# Patient Record
Sex: MALE | Race: WHITE | NOT HISPANIC OR LATINO | URBAN - METROPOLITAN AREA
[De-identification: names, ages, dates, MRNs, and addresses within clinical notes are randomized per-mention and may not be internally consistent; named-entity substitution may affect disease eponyms.]

---

## 2018-06-14 ENCOUNTER — EMERGENCY (EMERGENCY)
Facility: HOSPITAL | Age: 83
LOS: 1 days | Discharge: HOME | End: 2018-06-14
Attending: EMERGENCY MEDICINE
Payer: COMMERCIAL

## 2018-06-14 VITALS
SYSTOLIC BLOOD PRESSURE: 162 MMHG | HEART RATE: 93 BPM | RESPIRATION RATE: 18 BRPM | DIASTOLIC BLOOD PRESSURE: 93 MMHG | OXYGEN SATURATION: 94 % | TEMPERATURE: 98 F

## 2018-06-14 VITALS
OXYGEN SATURATION: 96 % | HEART RATE: 77 BPM | DIASTOLIC BLOOD PRESSURE: 81 MMHG | TEMPERATURE: 98 F | SYSTOLIC BLOOD PRESSURE: 154 MMHG | RESPIRATION RATE: 19 BRPM

## 2018-06-14 PROCEDURE — 76376 3D RENDER W/INTRP POSTPROCES: CPT | Mod: 26

## 2018-06-14 PROCEDURE — 73120 X-RAY EXAM OF HAND: CPT | Mod: 26,LT

## 2018-06-14 PROCEDURE — 70450 CT HEAD/BRAIN W/O DYE: CPT

## 2018-06-14 PROCEDURE — 99284 EMERGENCY DEPT VISIT MOD MDM: CPT | Mod: 25

## 2018-06-14 PROCEDURE — 70486 CT MAXILLOFACIAL W/O DYE: CPT

## 2018-06-14 PROCEDURE — 93010 ELECTROCARDIOGRAM REPORT: CPT

## 2018-06-14 PROCEDURE — 70450 CT HEAD/BRAIN W/O DYE: CPT | Mod: 26

## 2018-06-14 PROCEDURE — 70486 CT MAXILLOFACIAL W/O DYE: CPT | Mod: 26

## 2018-06-14 PROCEDURE — 73030 X-RAY EXAM OF SHOULDER: CPT

## 2018-06-14 PROCEDURE — 73120 X-RAY EXAM OF HAND: CPT

## 2018-06-14 PROCEDURE — 76376 3D RENDER W/INTRP POSTPROCES: CPT

## 2018-06-14 PROCEDURE — 71045 X-RAY EXAM CHEST 1 VIEW: CPT

## 2018-06-14 PROCEDURE — 93005 ELECTROCARDIOGRAM TRACING: CPT

## 2018-06-14 PROCEDURE — 71045 X-RAY EXAM CHEST 1 VIEW: CPT | Mod: 26

## 2018-06-14 PROCEDURE — 73030 X-RAY EXAM OF SHOULDER: CPT | Mod: 26,LT

## 2018-06-14 RX ORDER — ACETAMINOPHEN 500 MG
650 TABLET ORAL ONCE
Qty: 0 | Refills: 0 | Status: COMPLETED | OUTPATIENT
Start: 2018-06-14 | End: 2018-06-14

## 2018-06-14 RX ADMIN — Medication 650 MILLIGRAM(S): at 13:55

## 2018-06-14 RX ADMIN — Medication 650 MILLIGRAM(S): at 14:24

## 2018-06-14 NOTE — CONSULT NOTE ADULT - SUBJECTIVE AND OBJECTIVE BOX
· Chief Complaint: The patient is a 89y Male complaining of	  · HPI Objective Statement: 89yoM pw trip and fall while stepping up a curb, hitting front of left forehead. patient was able to get up, did not pass out and now is having a mild headache, throbbing, frontal. no other injury or pain. no chest pain, sob, weakness, numbness, tingling, nausea, vomiting, diarrhea, or other issues.	    PAST MEDICAL/SURGICAL/FAMILY/SOCIAL HISTORY:    Past Medical History:  BPH (Benign Prostatic Hyperplasia)    Hyperlipidemia    Hypertension.     Past Surgical History:  Hernia of abdominal cavity  60 years ago.     Family History:  No pertinent family history in first degree relatives.     Tobacco Usage:  · Tobacco Usage	Never smoker	    ALLERGIES AND HOME MEDICATIONS:   Allergies:        Allergies:  	No Known Allergies:     Home Medications:   * Outpatient Medication Status not yet specified    REVIEW OF SYSTEMS:    Review of Systems:  · CONSTITUTIONAL: - - -	  · Constitutional [-]: no chills, no fever	  · CARDIOVASCULAR: - - -	  · Cardiovascular [-]: no chest pain, no syncope	  · RESPIRATORY: - - -	  · Respiratory [-]: no cough, no shortness of breath	  · SKIN: - - -	  · Skin [+]: LACERATION	  · NEUROLOGICAL: - - -	  · Neurological [+]: HEADACHE	    Imaging:  L zygomatic arch with mild depression  All other fxr non-dispalced

## 2018-06-14 NOTE — CONSULT NOTE ADULT - ASSESSMENT
89M s/p mechanical fall with non/minimally displaced facial fxr.  - no need for acute surgical intervention  - no need for abx  - can f/u PRN  - d/w Dr. Riley

## 2018-06-14 NOTE — ED PROVIDER NOTE - OBJECTIVE STATEMENT
89yoM pw trip and fall while stepping up a curb, hitting front of left forehead. patient was able to get up, did not pass out and now is having a mild headache, throbbing, frontal. no other injury or pain. no chest pain, sob, weakness, numbness, tingling, nausea, vomiting, diarrhea, or other issues.

## 2018-06-14 NOTE — ED PROVIDER NOTE - PLAN OF CARE
1) Follow up with your doctor in 1 week. Call [phone] Dr. Riley.   2) Return to the ER immediately for new or worsening symptoms including fevers, changes in vision or other issues.   3) Take Tylenol 650mg every 6 hours as needed for pain.

## 2018-06-14 NOTE — ED PROVIDER NOTE - ATTENDING CONTRIBUTION TO CARE
Patient mechanical trip and fall, struck forehead with lac, also L hand.  No LOC, no visual changes.  Denying pain in chest, abdomen, pelvis, lower extremities.  "Nothing feels broken".  No significant headaches no nausea.    On exam patient well appearing, vital signs within normal limits, GCS 15, A&Ox4, RRR S1/S2, lungs clear to ascultation bilaterally, abdomen soft, non tender, non distended, no noted trauma to chest abdomen/pelvis, normal ROM of all extremities.    Screening imaging for traumatic injury, ambulate.

## 2018-06-14 NOTE — ED PROVIDER NOTE - PROGRESS NOTE DETAILS
discussed with plastic surgery attending. will follow up in office in 1 week. wound care. okay to gavin. Received sign out from Dr. Monahan. In brief, 89 year old male with mechanical fall Received sign out from Dr. Monahan. In brief, 89 year old male with mechanical fall hit left side of face sustaining small lac and multiple facial fractures. Case discussed with Dr. Gupta/Osvaldo from facial plastics, reviewed scans, do not recommend antibiotics, ok for outpatient follow up. Discussed plan of care and results with patient. Patient comfortable with discharge plan, understands return instructions. DONAVON Jaimes MD

## 2018-06-14 NOTE — ED PROVIDER NOTE - CARE PLAN
Principal Discharge DX:	Fall, initial encounter Principal Discharge DX:	Fall, initial encounter  Assessment and plan of treatment:	1) Follow up with your doctor in 1 week. Call [phone] Dr. Riley.   2) Return to the ER immediately for new or worsening symptoms including fevers, changes in vision or other issues.   3) Take Tylenol 650mg every 6 hours as needed for pain.

## 2018-06-14 NOTE — ED PROVIDER NOTE - PHYSICAL EXAMINATION
small, 1 cm laceration to left upper brow with some mild oozing of blood  superficial abrasion to left lateral temporal face.

## 2018-06-14 NOTE — ED ADULT NURSE REASSESSMENT NOTE - NS ED NURSE REASSESS COMMENT FT1
patient is resting in the erwin waiting for repeat ct scan. patient denies any complaints. vss/nad. will continue to monitor. family at bedside.

## 2018-06-21 ENCOUNTER — APPOINTMENT (OUTPATIENT)
Dept: PLASTIC SURGERY | Facility: CLINIC | Age: 83
End: 2018-06-21
Payer: MEDICARE

## 2018-06-21 VITALS
HEIGHT: 63.5 IN | WEIGHT: 142 LBS | SYSTOLIC BLOOD PRESSURE: 132 MMHG | HEART RATE: 71 BPM | BODY MASS INDEX: 24.85 KG/M2 | DIASTOLIC BLOOD PRESSURE: 70 MMHG

## 2018-06-21 DIAGNOSIS — S02.92XA UNSPECIFIED FRACTURE OF FACIAL BONES, INITIAL ENCOUNTER FOR CLOSED FRACTURE: ICD-10-CM

## 2018-06-21 PROCEDURE — 99203 OFFICE O/P NEW LOW 30 MIN: CPT

## 2018-07-02 PROBLEM — S02.92XA FACIAL FRACTURE: Status: ACTIVE | Noted: 2018-07-02

## 2018-07-02 RX ORDER — SIMVASTATIN 20 MG/1
20 TABLET, FILM COATED ORAL
Refills: 0 | Status: ACTIVE | COMMUNITY

## 2018-07-02 RX ORDER — POLYETHYLENE GLYCOL 3350 17 G/17G
17 POWDER, FOR SOLUTION ORAL
Refills: 0 | Status: ACTIVE | COMMUNITY

## 2018-07-02 RX ORDER — FERROUS ASPARTO GLYCINATE, IRON, ASCORBIC ACID, FOLIC ACID, CYANOCOBALAMIN, ZINC, SUCCINIC ACID, AND INTRINSIC FACTOR 50; 100; 60; 750; 250; 25; 15; 50; 100 MG/1; MG/1; MG/1; UG/1; UG/1; UG/1; MG/1; MG/1; MG/1
TABLET ORAL
Refills: 0 | Status: ACTIVE | COMMUNITY

## 2018-07-02 RX ORDER — DEXTRAN 70/DEXTROSE 10 %-WATER 32 %
VIAL (ML) INJECTION
Refills: 0 | Status: ACTIVE | COMMUNITY

## 2018-07-02 RX ORDER — FLUTICASONE PROPIONATE AND SALMETEROL 500; 50 UG/1; UG/1
POWDER RESPIRATORY (INHALATION)
Refills: 0 | Status: ACTIVE | COMMUNITY

## 2018-07-02 RX ORDER — FINASTERIDE 5 MG/1
5 TABLET, FILM COATED ORAL
Refills: 0 | Status: ACTIVE | COMMUNITY

## 2018-07-02 RX ORDER — PANTOPRAZOLE 40 MG/1
40 TABLET, DELAYED RELEASE ORAL
Refills: 0 | Status: ACTIVE | COMMUNITY

## 2018-07-02 RX ORDER — SODIUM CHLORIDE 0.65 %
0.65 AEROSOL, SPRAY (ML) NASAL
Refills: 0 | Status: ACTIVE | COMMUNITY

## 2018-07-02 RX ORDER — HYDROCHLOROTHIAZIDE 25 MG/1
25 TABLET ORAL
Refills: 0 | Status: ACTIVE | COMMUNITY

## 2019-05-23 ENCOUNTER — APPOINTMENT (OUTPATIENT)
Dept: DERMATOLOGY | Facility: CLINIC | Age: 84
End: 2019-05-23
Payer: MEDICARE

## 2019-05-23 ENCOUNTER — LABORATORY RESULT (OUTPATIENT)
Age: 84
End: 2019-05-23

## 2019-05-23 VITALS — WEIGHT: 147 LBS | BODY MASS INDEX: 25.63 KG/M2

## 2019-05-23 DIAGNOSIS — L23.9 ALLERGIC CONTACT DERMATITIS, UNSPECIFIED CAUSE: ICD-10-CM

## 2019-05-23 DIAGNOSIS — R21 RASH AND OTHER NONSPECIFIC SKIN ERUPTION: ICD-10-CM

## 2019-05-23 DIAGNOSIS — D48.9 NEOPLASM OF UNCERTAIN BEHAVIOR, UNSPECIFIED: ICD-10-CM

## 2019-05-23 PROCEDURE — 11102 TANGNTL BX SKIN SINGLE LES: CPT

## 2019-05-23 PROCEDURE — 99203 OFFICE O/P NEW LOW 30 MIN: CPT | Mod: 25

## 2019-05-23 RX ORDER — TRIAMCINOLONE ACETONIDE 1 MG/G
0.1 OINTMENT TOPICAL TWICE DAILY
Qty: 1 | Refills: 1 | Status: ACTIVE | COMMUNITY
Start: 2019-05-23 | End: 1900-01-01

## 2019-05-31 ENCOUNTER — MOBILE ON CALL (OUTPATIENT)
Age: 84
End: 2019-05-31

## 2021-09-26 ENCOUNTER — EMERGENCY (EMERGENCY)
Facility: HOSPITAL | Age: 86
LOS: 1 days | Discharge: HOME | End: 2021-09-26
Attending: EMERGENCY MEDICINE | Admitting: EMERGENCY MEDICINE
Payer: MEDICARE

## 2021-09-26 VITALS
RESPIRATION RATE: 16 BRPM | TEMPERATURE: 98 F | HEART RATE: 99 BPM | DIASTOLIC BLOOD PRESSURE: 71 MMHG | HEIGHT: 67 IN | OXYGEN SATURATION: 100 % | SYSTOLIC BLOOD PRESSURE: 108 MMHG

## 2021-09-26 PROCEDURE — 99284 EMERGENCY DEPT VISIT MOD MDM: CPT | Mod: 25

## 2021-09-26 PROCEDURE — 93010 ELECTROCARDIOGRAM REPORT: CPT

## 2021-09-26 NOTE — ED ADULT TRIAGE NOTE - CHIEF COMPLAINT QUOTE
A&ox4 male presenting for general malaise, as per daughter patient has been eating as much and appears pale. patient c/o lower back pain. no chest pain/ sob. ekg in progress.

## 2021-09-27 VITALS
TEMPERATURE: 98 F | HEART RATE: 93 BPM | SYSTOLIC BLOOD PRESSURE: 151 MMHG | RESPIRATION RATE: 15 BRPM | OXYGEN SATURATION: 100 % | DIASTOLIC BLOOD PRESSURE: 67 MMHG

## 2021-09-27 LAB
ALBUMIN SERPL ELPH-MCNC: 2.6 G/DL — LOW (ref 3.3–5)
ALP SERPL-CCNC: 725 U/L — HIGH (ref 40–120)
ALT FLD-CCNC: 22 U/L — SIGNIFICANT CHANGE UP (ref 4–41)
ANION GAP SERPL CALC-SCNC: 13 MMOL/L — SIGNIFICANT CHANGE UP (ref 7–14)
APPEARANCE UR: CLEAR — SIGNIFICANT CHANGE UP
AST SERPL-CCNC: 55 U/L — HIGH (ref 4–40)
BASOPHILS # BLD AUTO: 0.04 K/UL — SIGNIFICANT CHANGE UP (ref 0–0.2)
BASOPHILS NFR BLD AUTO: 0.5 % — SIGNIFICANT CHANGE UP (ref 0–2)
BILIRUB SERPL-MCNC: 0.9 MG/DL — SIGNIFICANT CHANGE UP (ref 0.2–1.2)
BILIRUB UR-MCNC: NEGATIVE — SIGNIFICANT CHANGE UP
BUN SERPL-MCNC: 19 MG/DL — SIGNIFICANT CHANGE UP (ref 7–23)
CALCIUM SERPL-MCNC: 8 MG/DL — LOW (ref 8.4–10.5)
CHLORIDE SERPL-SCNC: 99 MMOL/L — SIGNIFICANT CHANGE UP (ref 98–107)
CO2 SERPL-SCNC: 22 MMOL/L — SIGNIFICANT CHANGE UP (ref 22–31)
COLOR SPEC: YELLOW — SIGNIFICANT CHANGE UP
CREAT SERPL-MCNC: 1.26 MG/DL — SIGNIFICANT CHANGE UP (ref 0.5–1.3)
DIFF PNL FLD: ABNORMAL
EOSINOPHIL # BLD AUTO: 0.07 K/UL — SIGNIFICANT CHANGE UP (ref 0–0.5)
EOSINOPHIL NFR BLD AUTO: 0.9 % — SIGNIFICANT CHANGE UP (ref 0–6)
GLUCOSE SERPL-MCNC: 103 MG/DL — HIGH (ref 70–99)
GLUCOSE UR QL: NEGATIVE — SIGNIFICANT CHANGE UP
HCT VFR BLD CALC: 27.1 % — LOW (ref 39–50)
HGB BLD-MCNC: 9.1 G/DL — LOW (ref 13–17)
IANC: 4.65 K/UL — SIGNIFICANT CHANGE UP (ref 1.5–8.5)
IMM GRANULOCYTES NFR BLD AUTO: 0.4 % — SIGNIFICANT CHANGE UP (ref 0–1.5)
KETONES UR-MCNC: ABNORMAL
LEUKOCYTE ESTERASE UR-ACNC: ABNORMAL
LYMPHOCYTES # BLD AUTO: 1.63 K/UL — SIGNIFICANT CHANGE UP (ref 1–3.3)
LYMPHOCYTES # BLD AUTO: 21.8 % — SIGNIFICANT CHANGE UP (ref 13–44)
MAGNESIUM SERPL-MCNC: 2.1 MG/DL — SIGNIFICANT CHANGE UP (ref 1.6–2.6)
MCHC RBC-ENTMCNC: 30.6 PG — SIGNIFICANT CHANGE UP (ref 27–34)
MCHC RBC-ENTMCNC: 33.6 GM/DL — SIGNIFICANT CHANGE UP (ref 32–36)
MCV RBC AUTO: 91.2 FL — SIGNIFICANT CHANGE UP (ref 80–100)
MONOCYTES # BLD AUTO: 1.06 K/UL — HIGH (ref 0–0.9)
MONOCYTES NFR BLD AUTO: 14.2 % — HIGH (ref 2–14)
NEUTROPHILS # BLD AUTO: 4.65 K/UL — SIGNIFICANT CHANGE UP (ref 1.8–7.4)
NEUTROPHILS NFR BLD AUTO: 62.2 % — SIGNIFICANT CHANGE UP (ref 43–77)
NITRITE UR-MCNC: NEGATIVE — SIGNIFICANT CHANGE UP
NRBC # BLD: 0 /100 WBCS — SIGNIFICANT CHANGE UP
NRBC # FLD: 0 K/UL — SIGNIFICANT CHANGE UP
PH UR: 5.5 — SIGNIFICANT CHANGE UP (ref 5–8)
PHOSPHATE SERPL-MCNC: 3.6 MG/DL — SIGNIFICANT CHANGE UP (ref 2.5–4.5)
PLATELET # BLD AUTO: 344 K/UL — SIGNIFICANT CHANGE UP (ref 150–400)
POTASSIUM SERPL-MCNC: 5.5 MMOL/L — HIGH (ref 3.5–5.3)
POTASSIUM SERPL-SCNC: 5.5 MMOL/L — HIGH (ref 3.5–5.3)
PROT SERPL-MCNC: 5.8 G/DL — LOW (ref 6–8.3)
PROT UR-MCNC: ABNORMAL
RBC # BLD: 2.97 M/UL — LOW (ref 4.2–5.8)
RBC # FLD: 15.6 % — HIGH (ref 10.3–14.5)
SODIUM SERPL-SCNC: 134 MMOL/L — LOW (ref 135–145)
SP GR SPEC: 1.03 — SIGNIFICANT CHANGE UP (ref 1–1.05)
UROBILINOGEN FLD QL: SIGNIFICANT CHANGE UP
WBC # BLD: 7.48 K/UL — SIGNIFICANT CHANGE UP (ref 3.8–10.5)
WBC # FLD AUTO: 7.48 K/UL — SIGNIFICANT CHANGE UP (ref 3.8–10.5)

## 2021-09-27 PROCEDURE — 71045 X-RAY EXAM CHEST 1 VIEW: CPT | Mod: 26

## 2021-09-27 RX ORDER — CEFTRIAXONE 500 MG/1
1000 INJECTION, POWDER, FOR SOLUTION INTRAMUSCULAR; INTRAVENOUS ONCE
Refills: 0 | Status: COMPLETED | OUTPATIENT
Start: 2021-09-27 | End: 2021-09-27

## 2021-09-27 RX ORDER — ACETAMINOPHEN 500 MG
650 TABLET ORAL ONCE
Refills: 0 | Status: COMPLETED | OUTPATIENT
Start: 2021-09-27 | End: 2021-09-27

## 2021-09-27 RX ORDER — SODIUM CHLORIDE 9 MG/ML
500 INJECTION INTRAMUSCULAR; INTRAVENOUS; SUBCUTANEOUS ONCE
Refills: 0 | Status: COMPLETED | OUTPATIENT
Start: 2021-09-27 | End: 2021-09-27

## 2021-09-27 RX ORDER — CEPHALEXIN 500 MG
1 CAPSULE ORAL
Qty: 15 | Refills: 0
Start: 2021-09-27 | End: 2021-10-01

## 2021-09-27 RX ADMIN — Medication 650 MILLIGRAM(S): at 01:38

## 2021-09-27 RX ADMIN — SODIUM CHLORIDE 500 MILLILITER(S): 9 INJECTION INTRAMUSCULAR; INTRAVENOUS; SUBCUTANEOUS at 01:20

## 2021-09-27 RX ADMIN — CEFTRIAXONE 100 MILLIGRAM(S): 500 INJECTION, POWDER, FOR SOLUTION INTRAMUSCULAR; INTRAVENOUS at 02:56

## 2021-09-27 NOTE — ED PROVIDER NOTE - NS ED ROS FT
Constitutional: (+) generalized weakness, no fever and no chills  Eyes: no discharge, no pain, no visual changes  ENMT: no ear pain, no dysphagia or throat pain  Neck: no pain, no stiffness  CV: no chest pain, no palpitations, no edema  Resp: no cough, no shortness of breath  Abd: (+) decreased appetite, no abdominal pain, no nausea or vomiting, no diarrhea  : no dysuria, no hematuria  MSK: (+) back pain, no neck pain, no joint pain  Neuro: no LOC, no gait abnormality, no headache, no sensory deficits, no focal weakness  Skin: no rashes, no lacerations

## 2021-09-27 NOTE — ED PROVIDER NOTE - PROGRESS NOTE DETAILS
Curt EM/IM PGY4: Pt found to have UTI. CTX x1 ordered. Updated daughter on results and plan to dc to complete antibiotic course, PMD f/u in 48h, and return precautions which she verbalized understanding of and agrees with. Daughter feels safe taking patient home, will pick him up in 30 minutes.

## 2021-09-27 NOTE — ED PROVIDER NOTE - OBJECTIVE STATEMENT
89 y/o M PMH HTN, CAD, GERD, BPH presenting with 1 month of generalized weakness a/w intermittent increased pallor and decreased PO intake. Pt notes he has been feeling more weak, and that he just doesn't feel like eating as much. He denies any fevers/chills, chest pain, SOB, abd pain, n/v/d, urinary sxs, swelling, focal numbness or focal weakness. Pt notes intermittent lower back pain over the past month as well. Daughter states pt normally makes his own breakfast and walks downstairs in the mornings, but notes he's become too tired to make his own breakfast in the last 2 weeks and has not been walking downstairs as early as usual in the last week. [facility] but just obtained one for 2 days from now, however due to his increasing weakness and pallor she brought him to the ER for evaluation. No recent sick contacts. No recent medication changes.

## 2021-09-27 NOTE — ED PROVIDER NOTE - PHYSICAL EXAMINATION
PHYSICAL EXAM:  GENERAL: Sitting comfortable in bed, in no acute distress  HENMT: Atraumatic, moist mucous membranes  EYES: Clear bilaterally, PERRL, EOMs intact b/l  HEART: RRR, S1/S2, soft systolic murmur  RESPIRATORY: Clear to auscultation bilaterally, no wheezes/rhonchi/rales  ABDOMEN: +BS, soft, nontender, nondistended  BACK: No midline or paraspinal back tenderness  EXTREMITIES: No lower extremity edema, +2 radial pulses b/l  NEURO:  A&Ox4, no focal motor deficits or sensory deficits   SKIN:  Skin warm, dry and intact. No evidence of rash.

## 2021-09-27 NOTE — ED ADULT NURSE NOTE - OBJECTIVE STATEMENT
89 year old male complaining of generalized weakness, pt states it has been going on for 1 month. Pt. also complaining of decrease po intake. Pt denies fever, chills, chest pain, sob, dizziness, headache, denies urinary symptoms, denies diarrhea.  Lungs clear. NSR on cardiac monitor. 20g placed in right a/c labs sent pt medicated will monitor pt. 89 year old male complaining of generalized weakness, pt states it has been going on for 1 month. Pt. also complaining of decrease po intake. Pt denies fever, chills, chest pain, sob, dizziness, headache, denies urinary symptoms, denies diarrhea.  Pt also complaining of chronic back pain. Pt straight cath for urine, pt tolerated well. Lungs clear. NSR on cardiac monitor. 20g placed in right a/c labs sent pt medicated will monitor pt.

## 2021-09-27 NOTE — ED ADULT NURSE REASSESSMENT NOTE - NS ED NURSE REASSESS COMMENT FT1
Pt discharge, iv removed, pt brought out in wheelchair, pt pass walking test, dc instructions with pt, dc instructions discussed with daughter by MD Elias.

## 2021-09-27 NOTE — ED PROVIDER NOTE - CLINICAL SUMMARY MEDICAL DECISION MAKING FREE TEXT BOX
89 y/o M PMH HTN, CAD, GERD, BPH presenting with 1 month of generalized weakness a/w intermittent increased pallor and decreased PO intake. Overall well-appearing, no focal findings on exam. Afebrile, vital signs stable. EKG wnl. Will obtain basic labs, UA/culture, CXR r/o anemia v electrolyte abnormality v occult infection as cause of weakness, likely d/c home with PMD f/u already scheduled in 2 days.

## 2021-09-27 NOTE — ED PROVIDER NOTE - PATIENT PORTAL LINK FT
You can access the FollowMyHealth Patient Portal offered by [facility] by registering at the following website: [email or web address]. By joining [facility]’s FollowMyHealth portal, you will also be able to view your health information using other applications (apps) compatible with our system.

## 2021-09-27 NOTE — ED ADULT NURSE NOTE - NSIMPLEMENTINTERV_GEN_ALL_ED
Implemented All Fall with Harm Risk Interventions:  [place] to call system. Call bell, personal items and telephone within reach. Instruct patient to call for assistance. Room bathroom lighting operational. Non-slip footwear when patient is off stretcher. Physically safe environment: no spills, clutter or unnecessary equipment. Stretcher in lowest position, wheels locked, appropriate side rails in place. Provide visual cue, wrist band, yellow gown, etc. Monitor gait and stability. Monitor for mental status changes and reorient to person, place, and time. Review medications for side effects contributing to fall risk. Reinforce activity limits and safety measures with patient and family. Provide visual clues: red socks.

## 2021-09-27 NOTE — ED PROVIDER NOTE - ATTENDING CONTRIBUTION TO CARE
89 yo with HTN CAD BPH GERD with one month of progressive weakness and pallor.  He also reports not being as hungry and feels weak.  No FC abd pain NVD or urinary complaints.  Daughter provided collateral that he is normally much more independent than he has been in the last month.      Gen: Well appearing in NAD  Head: NC/AT  Neck: trachea midline  Resp:  No distress  Abd: soft NT ND  Ext: no deformities  Neuro:  A&O appears non focal  Skin:  Warm and dry as visualized  Psych:  Normal affect and mood     89 yo with HTN CAD BPH GERD with generalized weakness.  Need to consider lyte abnormality, anemia, or infectious etiology.  Also could be age related decline as well.  Will do broad workup.  With patient's age and level of independence at home will see if can find a cause that can be managed outpatient.  Lives with daughter who helps him.

## 2021-09-27 NOTE — ED PROVIDER NOTE - NSFOLLOWUPINSTRUCTIONS_ED_ALL_ED_FT
You were seen in the ER for weakness. You were found to have a urinary tract infection which is likely causing your symptoms. You will be discharged home and should continue to take keflex 500mg three times daily (every 8 hours) for the next 5 days. Follow-up with your primary care doctor in 48 hours at your already scheduled appointment to discuss your ER visit and for repeat evaluation and further management. Return to the ER for any new or worsening fevers/chills, headache, vision changes, neck pain, back pain, chest pain, difficulty breathing, abdominal pain, vomiting/inability to eat, numbness, weakness or any other new or concerning symptoms.

## 2021-10-05 ENCOUNTER — INPATIENT (INPATIENT)
Facility: HOSPITAL | Age: 86
LOS: 8 days | Discharge: NURSING, REHAB OR LONG-TERM CARE | End: 2021-10-14
Attending: HOSPITALIST | Admitting: HOSPITALIST
Payer: MEDICARE

## 2021-10-05 VITALS
TEMPERATURE: 98 F | OXYGEN SATURATION: 99 % | RESPIRATION RATE: 18 BRPM | HEART RATE: 98 BPM | SYSTOLIC BLOOD PRESSURE: 116 MMHG | HEIGHT: 67 IN | DIASTOLIC BLOOD PRESSURE: 70 MMHG

## 2021-10-05 LAB
ALBUMIN SERPL ELPH-MCNC: 2.5 G/DL — LOW (ref 3.3–5)
ALP SERPL-CCNC: 1129 U/L — HIGH (ref 40–120)
ALT FLD-CCNC: 22 U/L — SIGNIFICANT CHANGE UP (ref 4–41)
ANION GAP SERPL CALC-SCNC: 8 MMOL/L — SIGNIFICANT CHANGE UP (ref 7–14)
AST SERPL-CCNC: 28 U/L — SIGNIFICANT CHANGE UP (ref 4–40)
BASOPHILS # BLD AUTO: 0.04 K/UL — SIGNIFICANT CHANGE UP (ref 0–0.2)
BASOPHILS NFR BLD AUTO: 0.5 % — SIGNIFICANT CHANGE UP (ref 0–2)
BILIRUB SERPL-MCNC: 1.3 MG/DL — HIGH (ref 0.2–1.2)
BUN SERPL-MCNC: 24 MG/DL — HIGH (ref 7–23)
CALCIUM SERPL-MCNC: 8 MG/DL — LOW (ref 8.4–10.5)
CHLORIDE SERPL-SCNC: 109 MMOL/L — HIGH (ref 98–107)
CO2 SERPL-SCNC: 25 MMOL/L — SIGNIFICANT CHANGE UP (ref 22–31)
CREAT SERPL-MCNC: 1.17 MG/DL — SIGNIFICANT CHANGE UP (ref 0.5–1.3)
EOSINOPHIL # BLD AUTO: 0.04 K/UL — SIGNIFICANT CHANGE UP (ref 0–0.5)
EOSINOPHIL NFR BLD AUTO: 0.5 % — SIGNIFICANT CHANGE UP (ref 0–6)
GLUCOSE SERPL-MCNC: 107 MG/DL — HIGH (ref 70–99)
HCT VFR BLD CALC: 28.7 % — LOW (ref 39–50)
HGB BLD-MCNC: 9.3 G/DL — LOW (ref 13–17)
IANC: 5.5 K/UL — SIGNIFICANT CHANGE UP (ref 1.5–8.5)
IMM GRANULOCYTES NFR BLD AUTO: 0.7 % — SIGNIFICANT CHANGE UP (ref 0–1.5)
LYMPHOCYTES # BLD AUTO: 1.02 K/UL — SIGNIFICANT CHANGE UP (ref 1–3.3)
LYMPHOCYTES # BLD AUTO: 13.6 % — SIGNIFICANT CHANGE UP (ref 13–44)
MCHC RBC-ENTMCNC: 30.5 PG — SIGNIFICANT CHANGE UP (ref 27–34)
MCHC RBC-ENTMCNC: 32.4 GM/DL — SIGNIFICANT CHANGE UP (ref 32–36)
MCV RBC AUTO: 94.1 FL — SIGNIFICANT CHANGE UP (ref 80–100)
MONOCYTES # BLD AUTO: 0.86 K/UL — SIGNIFICANT CHANGE UP (ref 0–0.9)
MONOCYTES NFR BLD AUTO: 11.5 % — SIGNIFICANT CHANGE UP (ref 2–14)
NEUTROPHILS # BLD AUTO: 5.5 K/UL — SIGNIFICANT CHANGE UP (ref 1.8–7.4)
NEUTROPHILS NFR BLD AUTO: 73.2 % — SIGNIFICANT CHANGE UP (ref 43–77)
NRBC # BLD: 0 /100 WBCS — SIGNIFICANT CHANGE UP
NRBC # FLD: 0 K/UL — SIGNIFICANT CHANGE UP
PLATELET # BLD AUTO: 323 K/UL — SIGNIFICANT CHANGE UP (ref 150–400)
POTASSIUM SERPL-MCNC: 4.5 MMOL/L — SIGNIFICANT CHANGE UP (ref 3.5–5.3)
POTASSIUM SERPL-SCNC: 4.5 MMOL/L — SIGNIFICANT CHANGE UP (ref 3.5–5.3)
PROT SERPL-MCNC: 5.4 G/DL — LOW (ref 6–8.3)
RBC # BLD: 3.05 M/UL — LOW (ref 4.2–5.8)
RBC # FLD: 17.7 % — HIGH (ref 10.3–14.5)
SODIUM SERPL-SCNC: 142 MMOL/L — SIGNIFICANT CHANGE UP (ref 135–145)
WBC # BLD: 7.51 K/UL — SIGNIFICANT CHANGE UP (ref 3.8–10.5)
WBC # FLD AUTO: 7.51 K/UL — SIGNIFICANT CHANGE UP (ref 3.8–10.5)

## 2021-10-05 PROCEDURE — 72128 CT CHEST SPINE W/O DYE: CPT | Mod: 26

## 2021-10-05 PROCEDURE — 99285 EMERGENCY DEPT VISIT HI MDM: CPT

## 2021-10-05 PROCEDURE — 72131 CT LUMBAR SPINE W/O DYE: CPT | Mod: 26

## 2021-10-05 NOTE — ED PROVIDER NOTE - ATTENDING CONTRIBUTION TO CARE
I performed a face-to-face evaluation of the patient and performed a history and physical examination. I agree with the history and physical examination.    Saurabh: Seen here recently for increasing back pain. Treated for UTI, though Cx neg. Returns b/c back pain so bad it interferes w/ ability to do ADLs. Of note, Alk Phos was high last visit. 5/5 strength (B) LE. Concern for malignancy to spine, not yet causing cauda equina. Check labs, xray. Pain meds. Admit.

## 2021-10-05 NOTE — ED PROVIDER NOTE - NS ED ROS FT
Constitutional: No fever, chills.  Eyes:  No visual changes  ENMT:  No neck pain  Cardiac:  No chest pain  Respiratory:  No cough, SOB  GI:  No nausea, vomiting, diarrhea, abdominal pain.  :  No dysuria, hematuria  MS:  +back pain.  Neuro:  No headache or lightheadedness  Skin:  No skin rash  Except as documented in the HPI,  all other systems are negative.

## 2021-10-05 NOTE — ED PROVIDER NOTE - CLINICAL SUMMARY MEDICAL DECISION MAKING FREE TEXT BOX
Saurabh: Seen here recently for increasing back pain. Treated for UTI, though Cx neg. Returns b/c back pain so bad it interferes w/ ability to do ADLs. Of note, Alk Phos was high last visit. 5/5 strength (B) LE. Concern for malignancy to spine, not yet causing cauda equina. Check labs, xray. Pain meds. Admit.

## 2021-10-05 NOTE — ED PROVIDER NOTE - PHYSICAL EXAMINATION
Vital signs reviewed  GENERAL: Patient nontoxic appearing, NAD. cachetic   HEAD: NCAT  EYES: Anicteric, perrla eomi  ENT: MMM  NECK: Supple, non tender  RESPIRATORY: Normal respiratory effort. CTA B/L. No wheezing, rales, rhonchi  CARDIOVASCULAR: Regular rate and rhythm  ABDOMEN: Soft. Nondistended. Nontender. No guarding or rebound. No CVA tenderness.  MUSCULOSKELETAL/EXTREMITIES: Brisk cap refill. 2+ radial pulses. No leg edema. +L spine ttp, no step off.   RECTAL: normal rectal tone   SKIN:  Warm and dry  NEURO: AAOx3. No gross FND. sensation intact b/l. MS 5/5 UE and LE.   PSYCHIATRIC: Cooperative. Affect appropriate.

## 2021-10-05 NOTE — ED PROVIDER NOTE - OBJECTIVE STATEMENT
89 y/o M PMH HTN, CAD, GERD, BPH p/w hx of chronic back pain. pt states he has been having low back pain for months, worsening over past few months. unable to sit for meals. unable to go to bathroom without being in 9/10, sharp pain. unable to dress himself due to pain. pain mostly in lower back. states he takes Tylenol without any relief. +weight loss. lives with daughter who is primary care taker     pt denies any fever, chills, dizziness, cp, palpitations, sob, abdominal pain, n/v/d, dysuria, numbness/weakness, saddle anesthesia, falls/trauma  was seen recently in ED for UTI, negative Ux, finished abx course.

## 2021-10-05 NOTE — ED ADULT NURSE NOTE - OBJECTIVE STATEMENT
89 y/o male, a&ox4, received to rm 22 with c/o back pain that has been present for "the past few months." Pt has tried Tylenol for minimal relief. Pt denies any SOB, CP, and abdominal pain. No unlabored breathing. 89 y/o male, a&ox4, received to rm 22 with c/o back pain that has been present for "the past few months." Pt claims pain is intermittent and "is worst in the morning and goes away throughout the day." Pt has tried Tylenol for minimal relief. Pt denies any SOB, CP, and abdominal pain. No unlabored breathing or SOB noted. Abdomin is non-distended, flat, and skin is intact. Bruising and small break in skin noted on left elbow, dressing placed to prevent skin breakdown. NVS recorded. 20G IV placed in left wrist. Labs collected and sent off. Spoke to pt's daughter via cell phone and she shared updates about pt's PMH. Pt's daughter's contact info given to MD to further discuss plan of care.

## 2021-10-05 NOTE — ED ADULT TRIAGE NOTE - CHIEF COMPLAINT QUOTE
Pt brought in by EMS from home complaining of back pain worse when standing. Pt was recently discharged from the ED. Pt denies chest pain, sob, n/v/d, fever or chills.

## 2021-10-05 NOTE — ED PROVIDER NOTE - PROGRESS NOTE DETAILS
Aspen Novak PGY-2 spoke with daughter over the phone, concerned that patient cannot take care of himself due to pain. agreed with plan for admission given unable to perform ADLs and weight loss.  daughter phone number [phone] Dr. Garcia: Pt signed out to me by Dr. Wiley. Pt to be admitted for further pain control and metastatic work up.

## 2021-10-06 DIAGNOSIS — M54.50 LOW BACK PAIN, UNSPECIFIED: ICD-10-CM

## 2021-10-06 DIAGNOSIS — R62.7 ADULT FAILURE TO THRIVE: ICD-10-CM

## 2021-10-06 DIAGNOSIS — Z98.890 OTHER SPECIFIED POSTPROCEDURAL STATES: Chronic | ICD-10-CM

## 2021-10-06 DIAGNOSIS — D64.9 ANEMIA, UNSPECIFIED: ICD-10-CM

## 2021-10-06 DIAGNOSIS — M43.16 SPONDYLOLISTHESIS, LUMBAR REGION: ICD-10-CM

## 2021-10-06 DIAGNOSIS — Z29.9 ENCOUNTER FOR PROPHYLACTIC MEASURES, UNSPECIFIED: ICD-10-CM

## 2021-10-06 DIAGNOSIS — R74.8 ABNORMAL LEVELS OF OTHER SERUM ENZYMES: ICD-10-CM

## 2021-10-06 LAB
APTT BLD: 29.4 SEC — SIGNIFICANT CHANGE UP (ref 27–36.3)
FERRITIN SERPL-MCNC: 239 NG/ML — SIGNIFICANT CHANGE UP (ref 30–400)
INR BLD: 1.33 RATIO — HIGH (ref 0.88–1.16)
IRON SATN MFR SERPL: 17 % — SIGNIFICANT CHANGE UP (ref 14–50)
IRON SATN MFR SERPL: 21 UG/DL — LOW (ref 45–165)
LIDOCAIN IGE QN: 3 U/L — LOW (ref 7–60)
PROTHROM AB SERPL-ACNC: 15.1 SEC — HIGH (ref 10.6–13.6)
SARS-COV-2 RNA SPEC QL NAA+PROBE: SIGNIFICANT CHANGE UP
TIBC SERPL-MCNC: 127 UG/DL — LOW (ref 220–430)
UIBC SERPL-MCNC: 106 UG/DL — LOW (ref 110–370)

## 2021-10-06 PROCEDURE — 99497 ADVNCD CARE PLAN 30 MIN: CPT | Mod: 25

## 2021-10-06 PROCEDURE — 99223 1ST HOSP IP/OBS HIGH 75: CPT

## 2021-10-06 PROCEDURE — 76700 US EXAM ABDOM COMPLETE: CPT | Mod: 26

## 2021-10-06 RX ORDER — PSYLLIUM SEED (WITH DEXTROSE)
1 POWDER (GRAM) ORAL DAILY
Refills: 0 | Status: DISCONTINUED | OUTPATIENT
Start: 2021-10-06 | End: 2021-10-11

## 2021-10-06 RX ORDER — GABAPENTIN 400 MG/1
100 CAPSULE ORAL AT BEDTIME
Refills: 0 | Status: DISCONTINUED | OUTPATIENT
Start: 2021-10-06 | End: 2021-10-08

## 2021-10-06 RX ORDER — INFLUENZA VIRUS VACCINE 15; 15; 15; 15 UG/.5ML; UG/.5ML; UG/.5ML; UG/.5ML
0.5 SUSPENSION INTRAMUSCULAR ONCE
Refills: 0 | Status: DISCONTINUED | OUTPATIENT
Start: 2021-10-06 | End: 2021-10-14

## 2021-10-06 RX ORDER — FERROUS SULFATE 325(65) MG
325 TABLET ORAL
Refills: 0 | Status: DISCONTINUED | OUTPATIENT
Start: 2021-10-06 | End: 2021-10-09

## 2021-10-06 RX ORDER — ASPIRIN/CALCIUM CARB/MAGNESIUM 324 MG
81 TABLET ORAL DAILY
Refills: 0 | Status: DISCONTINUED | OUTPATIENT
Start: 2021-10-06 | End: 2021-10-07

## 2021-10-06 RX ORDER — SENNA PLUS 8.6 MG/1
1 TABLET ORAL
Refills: 0 | Status: DISCONTINUED | OUTPATIENT
Start: 2021-10-06 | End: 2021-10-11

## 2021-10-06 RX ORDER — FAMOTIDINE 10 MG/ML
20 INJECTION INTRAVENOUS DAILY
Refills: 0 | Status: DISCONTINUED | OUTPATIENT
Start: 2021-10-06 | End: 2021-10-14

## 2021-10-06 RX ORDER — ENOXAPARIN SODIUM 100 MG/ML
40 INJECTION SUBCUTANEOUS DAILY
Refills: 0 | Status: DISCONTINUED | OUTPATIENT
Start: 2021-10-06 | End: 2021-10-07

## 2021-10-06 RX ORDER — TAMSULOSIN HYDROCHLORIDE 0.4 MG/1
0.4 CAPSULE ORAL DAILY
Refills: 0 | Status: DISCONTINUED | OUTPATIENT
Start: 2021-10-06 | End: 2021-10-14

## 2021-10-06 RX ORDER — AMLODIPINE BESYLATE 2.5 MG/1
2.5 TABLET ORAL DAILY
Refills: 0 | Status: DISCONTINUED | OUTPATIENT
Start: 2021-10-06 | End: 2021-10-11

## 2021-10-06 RX ORDER — ACETAMINOPHEN 500 MG
650 TABLET ORAL EVERY 6 HOURS
Refills: 0 | Status: DISCONTINUED | OUTPATIENT
Start: 2021-10-06 | End: 2021-10-07

## 2021-10-06 RX ORDER — LANOLIN ALCOHOL/MO/W.PET/CERES
3 CREAM (GRAM) TOPICAL AT BEDTIME
Refills: 0 | Status: DISCONTINUED | OUTPATIENT
Start: 2021-10-06 | End: 2021-10-14

## 2021-10-06 RX ADMIN — GABAPENTIN 100 MILLIGRAM(S): 400 CAPSULE ORAL at 21:24

## 2021-10-06 RX ADMIN — Medication 650 MILLIGRAM(S): at 13:09

## 2021-10-06 RX ADMIN — Medication 3 MILLIGRAM(S): at 21:24

## 2021-10-06 RX ADMIN — FAMOTIDINE 20 MILLIGRAM(S): 10 INJECTION INTRAVENOUS at 13:02

## 2021-10-06 RX ADMIN — ENOXAPARIN SODIUM 40 MILLIGRAM(S): 100 INJECTION SUBCUTANEOUS at 13:03

## 2021-10-06 RX ADMIN — SENNA PLUS 1 TABLET(S): 8.6 TABLET ORAL at 18:16

## 2021-10-06 RX ADMIN — TAMSULOSIN HYDROCHLORIDE 0.4 MILLIGRAM(S): 0.4 CAPSULE ORAL at 13:02

## 2021-10-06 RX ADMIN — Medication 1 PACKET(S): at 13:03

## 2021-10-06 RX ADMIN — Medication 650 MILLIGRAM(S): at 19:48

## 2021-10-06 RX ADMIN — Medication 81 MILLIGRAM(S): at 13:09

## 2021-10-06 RX ADMIN — AMLODIPINE BESYLATE 2.5 MILLIGRAM(S): 2.5 TABLET ORAL at 13:03

## 2021-10-06 RX ADMIN — Medication 650 MILLIGRAM(S): at 20:30

## 2021-10-06 RX ADMIN — Medication 650 MILLIGRAM(S): at 10:38

## 2021-10-06 NOTE — H&P ADULT - HISTORY OF PRESENT ILLNESS
89yoM  w PMH of LBP s/p L hip arthroplasty (ab 13 years ago), unspecified gallbadder disease s/p [facility], CAD, HTN, BPH who is p/w generalized malaise and worsening lower back pain. History obtained from patient and then from patient's daughter, Anna. He states that he has had worsening LBP that is causing difficulty ambulating resulting in him being in bed for most of the day for the past week. Notably he was recently treated for a UTI w a 5 day cousrse of keflex from the ED, end date of 10/1. Pt reports no significant improvement in his pain after the course of antibiotics and is currently denying any dysuria, abd pain, fevers, chills, polyuria. Pt's daughter lives with him and helps him walk to and from the bathroom, but otherwise he has been bedridden for over 1 week. Endorses decreased appetite.      Pt's daughter also able to provide further medical history regarding his gallbladder disease-- states that 3 years ago he was told he had gallbladder cancer based [facility], however on further evaluation of pathology, he was told it most likely was NOT cancer. She also states he had "fluid surrounding his lungs" at that time that they elected to not further evaluate. 89yoM  w PMH of LBP s/p L hip arthroplasty (ab 13 years ago), unspecified gallbadder disease s/p biliary stent [facility], CAD, HTN, BPH who is p/w generalized malaise and worsening lower back pain. History obtained from patient and then from patient's daughter, Anna. He states that he has had worsening LBP that is causing difficulty ambulating resulting in him being in bed for most of the day for the past week. Notably he was recently treated for a UTI w a 5 day cousrse of keflex from the ED, end date of 10/1. Pt reports no significant improvement in his pain after the course of antibiotics and is currently denying any dysuria, abd pain, fevers, chills, polyuria. Pt's daughter lives with him and helps him walk to and from the bathroom, but otherwise he has been bedridden for over 1 week. Endorses decreased appetite. For pain mgmt, daughter has been giving him two 650 mg tablets once daily prn for pain.     Pt's daughter also able to provide further medical history regarding his gallbladder disease-- states that 3 years ago he was told he had gallbladder cancer based [facility], however on further evaluation of pathology, he was told it most likely was NOT cancer. She also states he had "fluid surrounding his lungs" at that time that they elected to not further evaluate.

## 2021-10-06 NOTE — H&P ADULT - NSICDXFAMILYHX_GEN_ALL_CORE_FT
FAMILY HISTORY:  Child  Still living? Unknown  FH: HTN (hypertension), Age at diagnosis: Age Unknown

## 2021-10-06 NOTE — ED ADULT NURSE REASSESSMENT NOTE - NS ED NURSE REASSESS COMMENT FT1
Pt to ultrasound for abdominal eval; no distress, pt continues to answer questions appropriately; OX4; sometimes however, speaks of things like, "The  was here last night and didn't give me pizza!"  Unsure if pt is confused about memories or jesting.

## 2021-10-06 NOTE — H&P ADULT - NSHPLABSRESULTS_GEN_ALL_CORE
LABS:                        9.3    7.51  )-----------( 323      ( 05 Oct 2021 21:02 )             28.7     10-05    142  |  109<H>  |  24<H>  ----------------------------<  107<H>  4.5   |  25  |  1.17    Ca    8.0<L>      05 Oct 2021 21:02    TPro  5.4<L>  /  Alb  2.5<L>  /  TBili  1.3<H>  /  DBili  x   /  AST  28  /  ALT  22  /  AlkPhos  1129<H>  10-05    RADIOLOGY & ADDITIONAL TESTS:    CT thoracic spine w/o showed:   "Partial visualization of a biliary stent. Bilateral pleural effusions are partially imaged, left greater than right. Left-sided pleural calcifications likely reflects prior asbestos exposure. Emphysematous changes in the lungs are suggested. Atheromatous calcification along the aorta.    IMPRESSION:    Diffuse osteopenia without displaced fracture.  Grade 1 L2-L3, L3-L4, and L4-L5 retrolisthesis.  Advanced multilevel spondylosis."

## 2021-10-06 NOTE — H&P ADULT - PROBLEM SELECTOR PLAN 1
Cholestatic pattern liver injury on admission w alk phos 1100 and t bili 1.3. AST/ALT wnl. History notable for unclear gallbladder disease, s/[facility] and [facility] biopsy-- pt was told he did not have a malignancy at that time. Family requesting GI/Hepatology consult for further management. Pt currently denying any abdominal pain and abd PE was unremarkable.  - PT/INR today  - Trend daily CMP  - Hepatology consult  - Recommend obtaining [facility] from [facility] if possible

## 2021-10-06 NOTE — H&P ADULT - NSHPREVIEWOFSYSTEMS_GEN_ALL_CORE
Review of Systems:   CONSTITUTIONAL: No fever, no fatigue  EYES: No eye pain or discharge  ENMT:  No sinus or throat pain  NECK: No pain or stiffness  RESPIRATORY: No cough, wheezing, chills or hemoptysis; No shortness of breath  CARDIOVASCULAR: No chest pain, dizziness, or leg swelling; daughter states he did report palpitations 2-3 days ago that have resolved  GASTROINTESTINAL: No abdominal or epigastric pain. No nausea, vomiting, or hematemesis; No diarrhea or constipation. No melena or hematochezia. Daughter states he typically has a BM 2-3 times per day and has been regular.   GENITOURINARY: No dysuria or incontinence  MUSCULOSKELETAL: As above. Lower back Pain with sitting for 5 minute increments of time. Lower back pain with ambulation.  NEUROLOGICAL: No headaches, memory loss, loss of strength, numbness, or tremors  PSYCHIATRIC: No depression, anxiety, mood swings, or difficulty sleeping  SKIN: No rashes, no skin changes

## 2021-10-06 NOTE — H&P ADULT - NSICDXPASTMEDICALHX_GEN_ALL_CORE_FT
PAST MEDICAL HISTORY:  BPH (Benign Prostatic Hyperplasia)     CAD (coronary artery disease)     Gall bladder disease     Hyperlipidemia     Hypertension

## 2021-10-06 NOTE — H&P ADULT - NSHPSOCIALHISTORY_GEN_ALL_CORE
Pt is , lives at home with his daughter. Denies ETOH, tobacco, or drug use. Ambulating with assistance (daughter) to and from bathroom. Notably, pt's son  2021 unexpectedly.

## 2021-10-06 NOTE — H&P ADULT - NSICDXPASTSURGICALHX_GEN_ALL_CORE_FT
PAST SURGICAL HISTORY:  Hernia of abdominal cavity 60 years ago    History of arthroplasty of left hip     History of biliary stent insertion

## 2021-10-06 NOTE — H&P ADULT - ASSESSMENT
89yoM  w PMH of LBP s/p L hip arthroplasty (ab 13 years ago), unspecified gallbadder disease s/p [facility], CAD, HTN, BPH who is p/w failure to thrive at home and worsening lower back pain.  89yoM  w PMH of LBP s/p L hip arthroplasty (ab 13 years ago), unspecified gallbadder disease s/p biliary stent [facility] in [facility], CAD, HTN, BPH who is p/w failure to thrive at home, decr PO intake, and worsening lower back pain. Recently completed 5 day course of keflex for a UTI. Cholestatic pattern liver test abnormalities concerning for worsening gallbladder ?disease (s/p bx, results unavailable for review as this was done at [facility]) vs malignancy.

## 2021-10-06 NOTE — H&P ADULT - NSHPPHYSICALEXAM_GEN_ALL_CORE
Vital Signs Last 24 Hrs  T(C): 36.9 (06 Oct 2021 05:08), Max: 36.9 (05 Oct 2021 17:05)  T(F): 98.4 (06 Oct 2021 05:08), Max: 98.4 (05 Oct 2021 17:05)  HR: 84 (06 Oct 2021 05:08) (78 - 98)  BP: 124/76 (06 Oct 2021 05:08) (116/70 - 125/86)  BP(mean): --  RR: 17 (06 Oct 2021 05:08) (17 - 18)  SpO2: 100% (06 Oct 2021 05:08) (99% - 100%)    CONSTITUTIONAL: Well-groomed, temporal wasting, in no apparent distress  EYES: No conjunctival or scleral injection, non-icteric; PERRLA and symmetric  ENMT: No external nasal lesions; nasal mucosa not inflamed; poor dentition; no pharyngeal injection or exudates, oral mucosa with dry membranes  NECK: Trachea midline   RESPIRATORY: Breathing comfortably; no dullness to percussion; lungs w crackles noted  in RLL field posteriorly  CARDIOVASCULAR: +S1S2, RRR, no M/G/R; no carotid bruits; pedal pulses full and symmetric; trace lower extremity edema  GASTROINTESTINAL: No palpable masses or tenderness, +BS throughout, no rebound/guarding; no hepatosplenomegaly  MUSCULOSKELETAL: no digital clubbing or cyanosis; no paraspinal tenderness; no vertebral body tenderness, no TTP of the bilateral iliac crest, no hematomas visualized, no sacral ulcers, no ulceration of the feet  SKIN: No rashes or ulcers noted; no subcutaneous nodules or induration palpable  NEUROLOGIC: CN II-XII intact  PSYCHIATRIC: A+O x 3; good historian, mood and affect appropriate; appropriate insight and judgment Vital Signs Last 24 Hrs  T(C): 36.9 (06 Oct 2021 05:08), Max: 36.9 (05 Oct 2021 17:05)  T(F): 98.4 (06 Oct 2021 05:08), Max: 98.4 (05 Oct 2021 17:05)  HR: 84 (06 Oct 2021 05:08) (78 - 98)  BP: 124/76 (06 Oct 2021 05:08) (116/70 - 125/86)  BP(mean): --  RR: 17 (06 Oct 2021 05:08) (17 - 18)  SpO2: 100% (06 Oct 2021 05:08) (99% - 100%)    CONSTITUTIONAL: Well-groomed, temporal wasting, in no apparent distress  EYES: No conjunctival or scleral injection, non-icteric; PERRLA and symmetric  ENMT: No external nasal lesions; nasal mucosa not inflamed; poor dentition; no pharyngeal injection or exudates, oral mucosa with dry membranes  NECK: Trachea midline   RESPIRATORY: Breathing comfortably; no dullness to percussion; lungs w crackles noted  in RLL field posteriorly  CARDIOVASCULAR: +S1S2, RRR, no M/G/R; no carotid bruits; pedal pulses full and symmetric; trace lower extremity edema  GASTROINTESTINAL: No palpable masses or tenderness, +BS throughout, no rebound/guarding; no hepatosplenomegaly  MUSCULOSKELETAL: no digital clubbing or cyanosis; no paraspinal tenderness; no vertebral body tenderness, no TTP of the bilateral iliac crest, no hematomas visualized, no sacral ulcers, no ulceration of the feet  SKIN: No rashes or ulcers noted; no subcutaneous nodules or induration palpable  NEUROLOGIC: CN II-XII intact, full strength of LEs bilaterally and symmetrically  PSYCHIATRIC: A+O x 3; good historian, mood and affect appropriate; appropriate insight and judgment

## 2021-10-06 NOTE — PATIENT PROFILE ADULT - VISION (WITH CORRECTIVE LENSES IF THE PATIENT USUALLY WEARS THEM):
one pair of glasses/Partially impaired: cannot see medication labels or newsprint, but can see obstacles in path, and the surrounding layout; can count fingers at arm's length

## 2021-10-06 NOTE — H&P ADULT - PROBLEM SELECTOR PLAN 5
Daughter states he has been on iron in the past but currently is not taking it. Unclear etiology of his anemia, but Hgb stable compared to Sept 2021 lab work. Pt not reporting any melena, hematochezia, hematuria.  - Iron profile, ferritin today  - Daily CBC

## 2021-10-06 NOTE — H&P ADULT - PROBLEM SELECTOR PLAN 3
Pt in bed for the past week predominately. Anorexia noted by family. Exam findings concerning for temporal wasting and trace LE edema. Albumin 2.5.  - Nutrition consult  - Case mgmt consult  - Regular diet with Ensure BID supplementation  - GOC discussed, see separate note. Pt DNR/DNI.

## 2021-10-06 NOTE — ED ADULT NURSE REASSESSMENT NOTE - NS ED NURSE REASSESS COMMENT FT1
received report form night RN, pt in spot 21A. A+Ox3, confused to time and forgetful of situation. pt denies chest pain and SOB, RR even and unlabored. complains of back pain, meds to be given as ordered. pt was OOB to bathroom with 1 assist and wheel chair. waiting for bed, will continue to monitor.

## 2021-10-06 NOTE — H&P ADULT - PROBLEM SELECTOR PLAN 2
CT thoracic/lumbar w/o reviewed. No focal findings on MSKL exam on admission.  - PT/OT eval and treat  - 650 mg PO tylenol q 6 hrs prn for pain

## 2021-10-06 NOTE — PATIENT PROFILE ADULT - AGENT'S NAME
-Pt was nauseous, weak and miserable throughout the morning and early afternoon.  -Pt received antiemetic medications with some relief.  -Pt skipped his first two meals this day. The Pt's third meal was ordered for him and he had very small amounts of a part of the foods in the meal.  -Pt was shaved and showered around the noon hour.  -Pt napped a great deal during the shift.   Anna Emerson

## 2021-10-06 NOTE — GOALS OF CARE CONVERSATION - ADVANCED CARE PLANNING - CONVERSATION DETAILS
Spoke with patient regarding code status. He was AxO x 4 at time of conversation, able to express wishes. Ultimately, stated he would like for his daughter Anna to make this decision because his son has just recently  (a code was run) and he was concerned about the cost of those medical bills.     Spoke with Anna, she states that she has had discussions with her father about this with [facility] as well. They would like for patient be DNR/DNI.

## 2021-10-07 DIAGNOSIS — R41.0 DISORIENTATION, UNSPECIFIED: ICD-10-CM

## 2021-10-07 LAB
ALBUMIN SERPL ELPH-MCNC: 2.3 G/DL — LOW (ref 3.3–5)
ALBUMIN SERPL ELPH-MCNC: 2.5 G/DL — LOW (ref 3.3–5)
ALP SERPL-CCNC: 1113 U/L — HIGH (ref 40–120)
ALP SERPL-CCNC: 1140 U/L — HIGH (ref 40–120)
ALT FLD-CCNC: 20 U/L — SIGNIFICANT CHANGE UP (ref 4–41)
ALT FLD-CCNC: 21 U/L — SIGNIFICANT CHANGE UP (ref 4–41)
AMMONIA BLD-MCNC: 34 UMOL/L — SIGNIFICANT CHANGE UP (ref 11–55)
ANION GAP SERPL CALC-SCNC: 9 MMOL/L — SIGNIFICANT CHANGE UP (ref 7–14)
ANION GAP SERPL CALC-SCNC: 9 MMOL/L — SIGNIFICANT CHANGE UP (ref 7–14)
AST SERPL-CCNC: 30 U/L — SIGNIFICANT CHANGE UP (ref 4–40)
AST SERPL-CCNC: 31 U/L — SIGNIFICANT CHANGE UP (ref 4–40)
BILIRUB DIRECT SERPL-MCNC: 0.7 MG/DL — HIGH (ref 0–0.2)
BILIRUB INDIRECT FLD-MCNC: 0.4 MG/DL — SIGNIFICANT CHANGE UP (ref 0–1)
BILIRUB SERPL-MCNC: 1.1 MG/DL — SIGNIFICANT CHANGE UP (ref 0.2–1.2)
BLD GP AB SCN SERPL QL: NEGATIVE — SIGNIFICANT CHANGE UP
BUN SERPL-MCNC: 24 MG/DL — HIGH (ref 7–23)
BUN SERPL-MCNC: 24 MG/DL — HIGH (ref 7–23)
CALCIUM SERPL-MCNC: 7.7 MG/DL — LOW (ref 8.4–10.5)
CALCIUM SERPL-MCNC: 7.7 MG/DL — LOW (ref 8.4–10.5)
CHLORIDE SERPL-SCNC: 107 MMOL/L — SIGNIFICANT CHANGE UP (ref 98–107)
CHLORIDE SERPL-SCNC: 108 MMOL/L — HIGH (ref 98–107)
CO2 SERPL-SCNC: 23 MMOL/L — SIGNIFICANT CHANGE UP (ref 22–31)
CO2 SERPL-SCNC: 25 MMOL/L — SIGNIFICANT CHANGE UP (ref 22–31)
COVID-19 SPIKE DOMAIN AB INTERP: POSITIVE
COVID-19 SPIKE DOMAIN ANTIBODY RESULT: >250 U/ML — HIGH
CREAT SERPL-MCNC: 1.01 MG/DL — SIGNIFICANT CHANGE UP (ref 0.5–1.3)
CREAT SERPL-MCNC: 1.04 MG/DL — SIGNIFICANT CHANGE UP (ref 0.5–1.3)
GLUCOSE SERPL-MCNC: 105 MG/DL — HIGH (ref 70–99)
GLUCOSE SERPL-MCNC: 90 MG/DL — SIGNIFICANT CHANGE UP (ref 70–99)
HCT VFR BLD CALC: 25.6 % — LOW (ref 39–50)
HCT VFR BLD CALC: 28.2 % — LOW (ref 39–50)
HGB BLD-MCNC: 8.5 G/DL — LOW (ref 13–17)
HGB BLD-MCNC: 9.3 G/DL — LOW (ref 13–17)
MAGNESIUM SERPL-MCNC: 2 MG/DL — SIGNIFICANT CHANGE UP (ref 1.6–2.6)
MCHC RBC-ENTMCNC: 30.9 PG — SIGNIFICANT CHANGE UP (ref 27–34)
MCHC RBC-ENTMCNC: 31.3 PG — SIGNIFICANT CHANGE UP (ref 27–34)
MCHC RBC-ENTMCNC: 33 GM/DL — SIGNIFICANT CHANGE UP (ref 32–36)
MCHC RBC-ENTMCNC: 33.2 GM/DL — SIGNIFICANT CHANGE UP (ref 32–36)
MCV RBC AUTO: 93.7 FL — SIGNIFICANT CHANGE UP (ref 80–100)
MCV RBC AUTO: 94.1 FL — SIGNIFICANT CHANGE UP (ref 80–100)
NRBC # BLD: 0 /100 WBCS — SIGNIFICANT CHANGE UP
NRBC # BLD: 0 /100 WBCS — SIGNIFICANT CHANGE UP
NRBC # FLD: 0 K/UL — SIGNIFICANT CHANGE UP
NRBC # FLD: 0 K/UL — SIGNIFICANT CHANGE UP
PHOSPHATE SERPL-MCNC: 2.8 MG/DL — SIGNIFICANT CHANGE UP (ref 2.5–4.5)
PLATELET # BLD AUTO: 285 K/UL — SIGNIFICANT CHANGE UP (ref 150–400)
PLATELET # BLD AUTO: 307 K/UL — SIGNIFICANT CHANGE UP (ref 150–400)
POTASSIUM SERPL-MCNC: 4.2 MMOL/L — SIGNIFICANT CHANGE UP (ref 3.5–5.3)
POTASSIUM SERPL-MCNC: 4.2 MMOL/L — SIGNIFICANT CHANGE UP (ref 3.5–5.3)
POTASSIUM SERPL-SCNC: 4.2 MMOL/L — SIGNIFICANT CHANGE UP (ref 3.5–5.3)
POTASSIUM SERPL-SCNC: 4.2 MMOL/L — SIGNIFICANT CHANGE UP (ref 3.5–5.3)
PROT SERPL-MCNC: 5 G/DL — LOW (ref 6–8.3)
PROT SERPL-MCNC: 5.2 G/DL — LOW (ref 6–8.3)
RBC # BLD: 2.72 M/UL — LOW (ref 4.2–5.8)
RBC # BLD: 3.01 M/UL — LOW (ref 4.2–5.8)
RBC # FLD: 17.9 % — HIGH (ref 10.3–14.5)
RBC # FLD: 18.2 % — HIGH (ref 10.3–14.5)
RH IG SCN BLD-IMP: POSITIVE — SIGNIFICANT CHANGE UP
SARS-COV-2 IGG+IGM SERPL QL IA: >250 U/ML — HIGH
SARS-COV-2 IGG+IGM SERPL QL IA: POSITIVE
SODIUM SERPL-SCNC: 140 MMOL/L — SIGNIFICANT CHANGE UP (ref 135–145)
SODIUM SERPL-SCNC: 141 MMOL/L — SIGNIFICANT CHANGE UP (ref 135–145)
TROPONIN T, HIGH SENSITIVITY RESULT: 55 NG/L — CRITICAL HIGH
WBC # BLD: 5.63 K/UL — SIGNIFICANT CHANGE UP (ref 3.8–10.5)
WBC # BLD: 6.05 K/UL — SIGNIFICANT CHANGE UP (ref 3.8–10.5)
WBC # FLD AUTO: 5.63 K/UL — SIGNIFICANT CHANGE UP (ref 3.8–10.5)
WBC # FLD AUTO: 6.05 K/UL — SIGNIFICANT CHANGE UP (ref 3.8–10.5)

## 2021-10-07 PROCEDURE — 99222 1ST HOSP IP/OBS MODERATE 55: CPT | Mod: GC

## 2021-10-07 PROCEDURE — 93010 ELECTROCARDIOGRAM REPORT: CPT

## 2021-10-07 PROCEDURE — 99233 SBSQ HOSP IP/OBS HIGH 50: CPT

## 2021-10-07 RX ORDER — DEXTROSE 50 % IN WATER 50 %
12.5 SYRINGE (ML) INTRAVENOUS ONCE
Refills: 0 | Status: DISCONTINUED | OUTPATIENT
Start: 2021-10-07 | End: 2021-10-14

## 2021-10-07 RX ORDER — DEXTROSE 50 % IN WATER 50 %
25 SYRINGE (ML) INTRAVENOUS ONCE
Refills: 0 | Status: DISCONTINUED | OUTPATIENT
Start: 2021-10-07 | End: 2021-10-14

## 2021-10-07 RX ORDER — ACETAMINOPHEN 500 MG
650 TABLET ORAL EVERY 6 HOURS
Refills: 0 | Status: DISCONTINUED | OUTPATIENT
Start: 2021-10-07 | End: 2021-10-14

## 2021-10-07 RX ORDER — SODIUM CHLORIDE 9 MG/ML
1000 INJECTION, SOLUTION INTRAVENOUS
Refills: 0 | Status: DISCONTINUED | OUTPATIENT
Start: 2021-10-07 | End: 2021-10-11

## 2021-10-07 RX ORDER — GLUCAGON INJECTION, SOLUTION 0.5 MG/.1ML
1 INJECTION, SOLUTION SUBCUTANEOUS ONCE
Refills: 0 | Status: DISCONTINUED | OUTPATIENT
Start: 2021-10-07 | End: 2021-10-14

## 2021-10-07 RX ORDER — DEXTROSE 50 % IN WATER 50 %
15 SYRINGE (ML) INTRAVENOUS ONCE
Refills: 0 | Status: DISCONTINUED | OUTPATIENT
Start: 2021-10-07 | End: 2021-10-14

## 2021-10-07 RX ADMIN — TAMSULOSIN HYDROCHLORIDE 0.4 MILLIGRAM(S): 0.4 CAPSULE ORAL at 12:27

## 2021-10-07 RX ADMIN — Medication 650 MILLIGRAM(S): at 19:02

## 2021-10-07 RX ADMIN — Medication 650 MILLIGRAM(S): at 02:35

## 2021-10-07 RX ADMIN — Medication 650 MILLIGRAM(S): at 17:43

## 2021-10-07 RX ADMIN — Medication 325 MILLIGRAM(S): at 05:40

## 2021-10-07 RX ADMIN — GABAPENTIN 100 MILLIGRAM(S): 400 CAPSULE ORAL at 21:14

## 2021-10-07 RX ADMIN — Medication 650 MILLIGRAM(S): at 10:05

## 2021-10-07 RX ADMIN — Medication 650 MILLIGRAM(S): at 01:48

## 2021-10-07 RX ADMIN — SENNA PLUS 1 TABLET(S): 8.6 TABLET ORAL at 05:40

## 2021-10-07 RX ADMIN — Medication 1 PACKET(S): at 12:27

## 2021-10-07 RX ADMIN — Medication 650 MILLIGRAM(S): at 23:07

## 2021-10-07 RX ADMIN — FAMOTIDINE 20 MILLIGRAM(S): 10 INJECTION INTRAVENOUS at 12:27

## 2021-10-07 RX ADMIN — Medication 650 MILLIGRAM(S): at 08:50

## 2021-10-07 RX ADMIN — Medication 3 MILLIGRAM(S): at 21:14

## 2021-10-07 RX ADMIN — SENNA PLUS 1 TABLET(S): 8.6 TABLET ORAL at 18:43

## 2021-10-07 RX ADMIN — AMLODIPINE BESYLATE 2.5 MILLIGRAM(S): 2.5 TABLET ORAL at 05:41

## 2021-10-07 RX ADMIN — Medication 81 MILLIGRAM(S): at 12:27

## 2021-10-07 NOTE — CONSULT NOTE ADULT - ASSESSMENT
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 2020 in a hospital in [place]) , CAD, HTN, BPH who is p/w generalized malaise and worsening lower back pain. History obtained from patient and then from patient's daughter, Anna. He states that he has had worsening LBP that is causing difficulty ambulating resulting in him being in bed for most of the day for the past week. Notably he was recently treated for a UTI w a 5 day cousrse of keflex from the ED, end date of 10/1. Pt reports no significant improvement in his pain after the course of antibiotics and is currently denying any dysuria, abd pain, fevers, chills, polyuria. Pt's daughter lives with him and helps him walk to and from the bathroom, but otherwise he has been bedridden for over 1 week. Endorses decreased appetite.    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 Suggestion of scattered hypoechoic lesions.             Bile ducts: Stent in place. Pneumobilia.  Gallbladder: Not visualized.  - I talked to his daughter Anna ( [phone]) over the phone, She is not very sure where and when the stent was placed, states that 3 years ago he was told he had gallbladder cancer based [facility], however on further evaluation of pathology, he was told it most likely was NOT cancer. He did not receive any chemo or radiation    Rec:  - Monitor LFT/INR daily  - send GGT and fractionated ALP  - CT abdomen/pelvis IV contrast  - Patient may not tolerate MRI/MRCP without sedation --> will hold off based on age and frailty  -  Send tumor markers--> , CEA, AFP  - I discussed with her daughter about the diagnostic and possible treatment plan, she is not sure about any invasive procedure or possible chemo/radiation. she will need time to think about it.  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 2020 in a hospital in [place]) , CAD, HTN, BPH who is p/w generalized malaise and worsening lower back pain. History obtained from patient and then from patient's daughter, Anna. He states that he has had worsening LBP that is causing difficulty ambulating resulting in him being in bed for most of the day for the past week. Notably he was recently treated for a UTI w a 5 day cousrse of keflex from the ED, end date of 10/1. Pt reports no significant improvement in his pain after the course of antibiotics and is currently denying any dysuria, abd pain, fevers, chills, polyuria. Pt's daughter lives with him and helps him walk to and from the bathroom, but otherwise he has been bedridden for over 1 week. Endorses decreased appetite.    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 Suggestion of scattered hypoechoic lesions.             Bile ducts: Stent in place. Pneumobilia.  Gallbladder: Not visualized.  - I talked to his daughter Anna ( [phone]) over the phone, She is not very sure where and when the stent was placed, states that 3 years ago he was told he had gallbladder cancer based [facility], however on further evaluation of pathology, he was told it most likely was NOT cancer. He did not receive any chemo or radiation    Rec:  - Monitor LFT/INR daily  - send GGT and fractionated ALP  -  Send tumor markers--> , CEA, AFP  - I discussed with her daughter about the diagnostic and possible treatment plan, she is not sure about any invasive procedure or possible chemo/radiation. she will need time to think about it.  - [facility] from [facility]

## 2021-10-07 NOTE — PROGRESS NOTE ADULT - ASSESSMENT
89yoM  w PMH of LBP s/p L hip arthroplasty (ab 13 years ago), unspecified gallbadder disease s/p [facility], CAD, HTN, BPH who is p/w failure to thrive at home, decr PO intake, and worsening lower back pain. Recently completed 5 day course of keflex for a UTI. Cholestatic pattern liver test abnormalities concerning for worsening gallbladder ?disease (s/p bx, results unavailable for review as this was done [facility]) vs malignancy. Abd US obtained on admission with ascites and findings concerning for metastatic liver disease. 89yoM  w PMH of LBP s/p L hip arthroplasty (ab 13 years ago), unspecified gallbadder disease s/p biliary stent [facility] in [facility], CAD, HTN, BPH who is p/w failure to thrive at home, decr PO intake, and worsening lower back pain. Recently completed 5 day course of keflex for a UTI. Cholestatic pattern liver test abnormalities concerning for worsening gallbladder ?disease (s/p bx, results unavailable for review as this was [facility]) vs malignancy. Abd US obtained on admission with ascites and findings concerning for metastatic liver disease.

## 2021-10-07 NOTE — CONSULT NOTE ADULT - ATTENDING COMMENTS
Patient with history of biliary intervention and placement of stent.  Now admitted with back pain under evaluation. Elevated alk phos is likely related to known hepatobiliary pathology. Bilirubin near normal and no evidence of cholangitis. Ultrasound suggested presence of possible malignant disease in liver. At present will await availability of prior records to determine if further intervention is warranted.

## 2021-10-07 NOTE — PROGRESS NOTE ADULT - PROBLEM SELECTOR PLAN 5
Daughter states he has been on iron in the past but currently is not taking it. Unclear etiology of his anemia, but Hgb stable compared to Sept 2021 lab work. Pt not reporting any melena, hematochezia, hematuria. Iron profile, ferritin on admission consistent with iron def anemia.   - ferrous sulfate q48 hr ordered this admission  - Daily CBC Daughter states he has been on iron in the past but currently is not taking it. Unclear etiology of his anemia, but Hgb stable compared to Sept 2021 lab work. Pt not reporting any melena, hematochezia, hematuria. Iron profile, ferritin on admission consistent with iron def anemia.   - ferrous sulfate q48 hr ordered this admission  - Daily CBC  - Type and screen Daughter states he has been on iron in the past but currently is not taking it. Unclear etiology of his anemia, but Hgb stable compared to Sept 2021 lab work. Pt not reporting any melena, hematochezia, hematuria. Iron profile, ferritin on admission consistent with anemia of chronic disease, however FIT from [facility] records was pos for occult blood, therefore suspect multifactorial etiology.   - ferrous sulfate q48 hr ordered this admission  - Daily CBC  - Type and screen

## 2021-10-07 NOTE — PROGRESS NOTE ADULT - SUBJECTIVE AND OBJECTIVE BOX
Pt sleeping comfortably. Disoriented upon awakening. No abdominal pain, hematochezia, melena reported overnight. Still complaining of LBP that is mildly responsive to Tylenol. Vitals HDS overnight.    [facility] Division of Hospital Medicine  Kait Amaro MD  Pager (M-F, 8A-5P): [phone]  Other Times:  [phone]      SUBJECTIVE / OVERNIGHT EVENTS:    MEDICATIONS  (STANDING):  amLODIPine   Tablet 2.5 milliGRAM(s) Oral daily  aspirin  chewable 81 milliGRAM(s) Oral daily  famotidine    Tablet 20 milliGRAM(s) Oral daily  ferrous    sulfate 325 milliGRAM(s) Oral <User Schedule>  gabapentin 100 milliGRAM(s) Oral at bedtime  influenza   Vaccine 0.5 milliLiter(s) IntraMuscular once  melatonin 3 milliGRAM(s) Oral at bedtime  psyllium Powder 1 Packet(s) Oral daily  senna 1 Tablet(s) Oral two times a day  tamsulosin 0.4 milliGRAM(s) Oral daily    MEDICATIONS  (PRN):  acetaminophen   Tablet .. 650 milliGRAM(s) Oral every 6 hours PRN Temp greater or equal to 38C (100.4F), Mild Pain (1 - 3)      I&O's Summary    06 Oct 2021 07:01  -  07 Oct 2021 07:00  --------------------------------------------------------  IN: 290 mL / OUT: 250 mL / NET: 40 mL        PHYSICAL EXAM:  Vital Signs Last 24 Hrs  T(C): 36.6 (07 Oct 2021 09:56), Max: 37 (06 Oct 2021 14:56)  T(F): 97.8 (07 Oct 2021 09:56), Max: 98.6 (06 Oct 2021 14:56)  HR: 79 (07 Oct 2021 09:56) (79 - 98)  BP: 129/64 (07 Oct 2021 09:56) (118/73 - 135/73)  BP(mean): --  RR: 18 (07 Oct 2021 09:56) (18 - 19)  SpO2: 97% (07 Oct 2021 09:56) (97% - 100%)  CONSTITUTIONAL: NAD, cachectic, well-groomed, pallor  EYES: conjunctiva and sclera clear, non icteric  ENMT: Dry oral mucosa, no pharyngeal injection or exudates; poor dentition  RESPIRATORY: Normal respiratory effort; bibasilar crackles appreciated, worse on the RLL posterior field  CARDIOVASCULAR: Regular rate and rhythm, normal S1 and S2, no murmur/rub/gallop; No lower extremity edema; Peripheral pulses are 2+ bilaterally  ABDOMEN: Nontender to palpation, normoactive bowel sounds, no rebound/guarding; No hepatosplenomegaly, no tense ascites  PSYCH: A+O to person, place, and time; affect appropriate  SKIN: No rashes; no palpable lesions    LABS:                        8.5    6.05  )-----------( 285      ( 07 Oct 2021 07:24 )             25.6     10-07    140  |  108<H>  |  24<H>  ----------------------------<  105<H>  4.2   |  23  |  1.04    Ca    7.7<L>      07 Oct 2021 07:24  Phos  2.8     10-07  Mg     2.00     10-07    TPro  5.0<L>  /  Alb  2.3<L>  /  TBili  1.1  /  DBili  0.7<H>  /  AST  30  /  ALT  20  /  AlkPhos  1113<H>  10-07    PT/INR - ( 06 Oct 2021 11:18 )   PT: 15.1 sec;   INR: 1.33 ratio         PTT - ( 06 Oct 2021 11:18 )  PTT:29.4 sec            RADIOLOGY & ADDITIONAL TESTS:  Results Reviewed:   Abd US results-- ascites visualized, findings concerning for malignancy      COORDINATION OF CARE:  Care Discussed with Consultants/Other Providers [Y/N]: GI/Hep  Prior or Outpatient Records Reviewed [Y/N]: N, unavailable   Pt sleeping comfortably. Disoriented upon awakening. No abdominal pain, hematochezia, melena reported overnight. Still complaining of LBP that is mildly responsive to Tylenol. Vitals HDS overnight. Breakfast tray untouched at bedside.    [facility]  Kait Amaro MD  Pager (M-F, 8A-5P): [phone]  Other Times:  [phone]      SUBJECTIVE / OVERNIGHT EVENTS:    MEDICATIONS  (STANDING):  amLODIPine   Tablet 2.5 milliGRAM(s) Oral daily  aspirin  chewable 81 milliGRAM(s) Oral daily  famotidine    Tablet 20 milliGRAM(s) Oral daily  ferrous    sulfate 325 milliGRAM(s) Oral <User Schedule>  gabapentin 100 milliGRAM(s) Oral at bedtime  influenza   Vaccine 0.5 milliLiter(s) IntraMuscular once  melatonin 3 milliGRAM(s) Oral at bedtime  psyllium Powder 1 Packet(s) Oral daily  senna 1 Tablet(s) Oral two times a day  tamsulosin 0.4 milliGRAM(s) Oral daily    MEDICATIONS  (PRN):  acetaminophen   Tablet .. 650 milliGRAM(s) Oral every 6 hours PRN Temp greater or equal to 38C (100.4F), Mild Pain (1 - 3)      I&O's Summary    06 Oct 2021 07:01  -  07 Oct 2021 07:00  --------------------------------------------------------  IN: 290 mL / OUT: 250 mL / NET: 40 mL        PHYSICAL EXAM:  Vital Signs Last 24 Hrs  T(C): 36.6 (07 Oct 2021 09:56), Max: 37 (06 Oct 2021 14:56)  T(F): 97.8 (07 Oct 2021 09:56), Max: 98.6 (06 Oct 2021 14:56)  HR: 79 (07 Oct 2021 09:56) (79 - 98)  BP: 129/64 (07 Oct 2021 09:56) (118/73 - 135/73)  BP(mean): --  RR: 18 (07 Oct 2021 09:56) (18 - 19)  SpO2: 97% (07 Oct 2021 09:56) (97% - 100%)  CONSTITUTIONAL: NAD, cachectic, well-groomed, pallor  EYES: conjunctiva and sclera clear, non icteric  ENMT: Dry oral mucosa, no pharyngeal injection or exudates; poor dentition  RESPIRATORY: Normal respiratory effort; bibasilar crackles appreciated, worse on the RLL posterior field  CARDIOVASCULAR: Regular rate and rhythm, normal S1 and S2, no murmur/rub/gallop; No lower extremity edema; Peripheral pulses are 2+ bilaterally  ABDOMEN: Nontender to palpation, normoactive bowel sounds, no rebound/guarding; No hepatosplenomegaly, no tense ascites  PSYCH: A+O to person, place, and time; affect appropriate  SKIN: No rashes; no palpable lesions    LABS:                        8.5    6.05  )-----------( 285      ( 07 Oct 2021 07:24 )             25.6     10-07    140  |  108<H>  |  24<H>  ----------------------------<  105<H>  4.2   |  23  |  1.04    Ca    7.7<L>      07 Oct 2021 07:24  Phos  2.8     10-07  Mg     2.00     10-07    TPro  5.0<L>  /  Alb  2.3<L>  /  TBili  1.1  /  DBili  0.7<H>  /  AST  30  /  ALT  20  /  AlkPhos  1113<H>  10-07    PT/INR - ( 06 Oct 2021 11:18 )   PT: 15.1 sec;   INR: 1.33 ratio         PTT - ( 06 Oct 2021 11:18 )  PTT:29.4 sec            RADIOLOGY & ADDITIONAL TESTS:  Results Reviewed:   Abd US results-- ascites visualized, findings concerning for malignancy      COORDINATION OF CARE:  Care Discussed with Consultants/Other Providers [Y/N]: GI/Hep  Prior or Outpatient Records Reviewed [Y/N]: N, unavailable   Pt sleeping comfortably. Disoriented upon awakening. No abdominal pain, hematochezia, melena reported overnight. Still complaining of LBP that is mildly responsive to Tylenol. Vitals HDS overnight. Breakfast tray untouched at bedside.    10/7 2pm: Phone call to daughter Anna. Updated regarding patient's decr PO intake. Still awaiting CT scan. Discussed that we will wait until we have results from CT scan, and then get palliative care involved once a diagnosis of his abdominal pathology is more clear. Also updated her regarding his delirium this am.    [facility] Division of Hospital Medicine  Kait Amaro MD  Pager (M-F, 8A-5P): [phone]  Other Times:  [phone]      SUBJECTIVE / OVERNIGHT EVENTS:    MEDICATIONS  (STANDING):  amLODIPine   Tablet 2.5 milliGRAM(s) Oral daily  aspirin  chewable 81 milliGRAM(s) Oral daily  famotidine    Tablet 20 milliGRAM(s) Oral daily  ferrous    sulfate 325 milliGRAM(s) Oral <User Schedule>  gabapentin 100 milliGRAM(s) Oral at bedtime  influenza   Vaccine 0.5 milliLiter(s) IntraMuscular once  melatonin 3 milliGRAM(s) Oral at bedtime  psyllium Powder 1 Packet(s) Oral daily  senna 1 Tablet(s) Oral two times a day  tamsulosin 0.4 milliGRAM(s) Oral daily    MEDICATIONS  (PRN):  acetaminophen   Tablet .. 650 milliGRAM(s) Oral every 6 hours PRN Temp greater or equal to 38C (100.4F), Mild Pain (1 - 3)      I&O's Summary    06 Oct 2021 07:01  -  07 Oct 2021 07:00  --------------------------------------------------------  IN: 290 mL / OUT: 250 mL / NET: 40 mL        PHYSICAL EXAM:  Vital Signs Last 24 Hrs  T(C): 36.6 (07 Oct 2021 09:56), Max: 37 (06 Oct 2021 14:56)  T(F): 97.8 (07 Oct 2021 09:56), Max: 98.6 (06 Oct 2021 14:56)  HR: 79 (07 Oct 2021 09:56) (79 - 98)  BP: 129/64 (07 Oct 2021 09:56) (118/73 - 135/73)  BP(mean): --  RR: 18 (07 Oct 2021 09:56) (18 - 19)  SpO2: 97% (07 Oct 2021 09:56) (97% - 100%)  CONSTITUTIONAL: NAD, cachectic, well-groomed, pallor  EYES: conjunctiva and sclera clear, non icteric  ENMT: Dry oral mucosa, no pharyngeal injection or exudates; poor dentition  RESPIRATORY: Normal respiratory effort; bibasilar crackles appreciated, worse on the RLL posterior field  CARDIOVASCULAR: Regular rate and rhythm, normal S1 and S2, no murmur/rub/gallop; No lower extremity edema; Peripheral pulses are 2+ bilaterally  ABDOMEN: Nontender to palpation, normoactive bowel sounds, no rebound/guarding; No hepatosplenomegaly, no tense ascites  PSYCH: A+O to person, place, and time; affect appropriate  SKIN: No rashes; no palpable lesions    LABS:                        8.5    6.05  )-----------( 285      ( 07 Oct 2021 07:24 )             25.6     10-07    140  |  108<H>  |  24<H>  ----------------------------<  105<H>  4.2   |  23  |  1.04    Ca    7.7<L>      07 Oct 2021 07:24  Phos  2.8     10-07  Mg     2.00     10-07    TPro  5.0<L>  /  Alb  2.3<L>  /  TBili  1.1  /  DBili  0.7<H>  /  AST  30  /  ALT  20  /  AlkPhos  1113<H>  10-07    PT/INR - ( 06 Oct 2021 11:18 )   PT: 15.1 sec;   INR: 1.33 ratio         PTT - ( 06 Oct 2021 11:18 )  PTT:29.4 sec            RADIOLOGY & ADDITIONAL TESTS:  Results Reviewed:   Abd US results-- ascites visualized, findings concerning for malignancy      COORDINATION OF CARE:  Care Discussed with Consultants/Other Providers [Y/N]: GI/Hep  Prior or Outpatient Records Reviewed [Y/N]: N, unavailable

## 2021-10-07 NOTE — PROGRESS NOTE ADULT - PROBLEM SELECTOR PLAN 1
Cholestatic pattern liver injury on admission w alk phos 1100 and t bili 1.3. AST/ALT wnl. History notable for unclear gallbladder disease, s/[facility] and [facility]-- pt was told he did not have a malignancy at that time. Family requesting GI/Hepatology consult for further management/prognostication. Pt currently denying any abdominal pain.   - PT/INR daily  - Trend daily CMP  - CA-19 9, CEA, GGT ordered  - Hepatology consulted  - [facility] if [facility]  - CT abd pelv w contrast 10/7 ordered Cholestatic pattern liver injury on admission w alk phos 1100 and t bili 1.3. AST/ALT wnl. History notable for unclear gallbladder disease, s/p biliary [facility] and post biopsy-- pt was told he did not have a malignancy at that time. Family requesting GI/Hepatology consult for further management/prognostication. Pt currently denying any abdominal pain.   - PT/INR daily  - Trend daily CMP  - CA-19 9, CEA, GGT ordered  - Hepatology consulted  - Recommend [facility] if possible  - CT abd pelv w contrast 10/7 ordered  - Cont bowel regimen-- senna, metamucil

## 2021-10-08 ENCOUNTER — TRANSCRIPTION ENCOUNTER (OUTPATIENT)
Age: 86
End: 2021-10-08

## 2021-10-08 DIAGNOSIS — Z51.5 ENCOUNTER FOR PALLIATIVE CARE: ICD-10-CM

## 2021-10-08 DIAGNOSIS — M54.9 DORSALGIA, UNSPECIFIED: ICD-10-CM

## 2021-10-08 DIAGNOSIS — C79.9 SECONDARY MALIGNANT NEOPLASM OF UNSPECIFIED SITE: ICD-10-CM

## 2021-10-08 DIAGNOSIS — I25.10 ATHEROSCLEROTIC HEART DISEASE OF NATIVE CORONARY ARTERY WITHOUT ANGINA PECTORIS: ICD-10-CM

## 2021-10-08 LAB
AFP-TM SERPL-MCNC: <1.8 NG/ML — SIGNIFICANT CHANGE UP
ALBUMIN SERPL ELPH-MCNC: 2.2 G/DL — LOW (ref 3.3–5)
ALP SERPL-CCNC: 1091 U/L — HIGH (ref 40–120)
ALT FLD-CCNC: 18 U/L — SIGNIFICANT CHANGE UP (ref 4–41)
ANION GAP SERPL CALC-SCNC: 10 MMOL/L — SIGNIFICANT CHANGE UP (ref 7–14)
AST SERPL-CCNC: 34 U/L — SIGNIFICANT CHANGE UP (ref 4–40)
BILIRUB SERPL-MCNC: 1 MG/DL — SIGNIFICANT CHANGE UP (ref 0.2–1.2)
BUN SERPL-MCNC: 23 MG/DL — SIGNIFICANT CHANGE UP (ref 7–23)
CALCIUM SERPL-MCNC: 7.5 MG/DL — LOW (ref 8.4–10.5)
CANCER AG19-9 SERPL-ACNC: 7322 U/ML — HIGH
CEA SERPL-MCNC: 9.7 NG/ML — HIGH (ref 1–3.8)
CHLORIDE SERPL-SCNC: 108 MMOL/L — HIGH (ref 98–107)
CO2 SERPL-SCNC: 21 MMOL/L — LOW (ref 22–31)
CREAT SERPL-MCNC: 0.97 MG/DL — SIGNIFICANT CHANGE UP (ref 0.5–1.3)
GLUCOSE SERPL-MCNC: 81 MG/DL — SIGNIFICANT CHANGE UP (ref 70–99)
HCT VFR BLD CALC: 26.3 % — LOW (ref 39–50)
HGB BLD-MCNC: 8.6 G/DL — LOW (ref 13–17)
MCHC RBC-ENTMCNC: 31.4 PG — SIGNIFICANT CHANGE UP (ref 27–34)
MCHC RBC-ENTMCNC: 32.7 GM/DL — SIGNIFICANT CHANGE UP (ref 32–36)
MCV RBC AUTO: 96 FL — SIGNIFICANT CHANGE UP (ref 80–100)
NRBC # BLD: 0 /100 WBCS — SIGNIFICANT CHANGE UP
NRBC # FLD: 0 K/UL — SIGNIFICANT CHANGE UP
PLATELET # BLD AUTO: 274 K/UL — SIGNIFICANT CHANGE UP (ref 150–400)
POTASSIUM SERPL-MCNC: 4.1 MMOL/L — SIGNIFICANT CHANGE UP (ref 3.5–5.3)
POTASSIUM SERPL-SCNC: 4.1 MMOL/L — SIGNIFICANT CHANGE UP (ref 3.5–5.3)
PROT SERPL-MCNC: 4.6 G/DL — LOW (ref 6–8.3)
RBC # BLD: 2.74 M/UL — LOW (ref 4.2–5.8)
RBC # FLD: 17.9 % — HIGH (ref 10.3–14.5)
SODIUM SERPL-SCNC: 139 MMOL/L — SIGNIFICANT CHANGE UP (ref 135–145)
TROPONIN T, HIGH SENSITIVITY RESULT: 51 NG/L — HIGH
WBC # BLD: 5.98 K/UL — SIGNIFICANT CHANGE UP (ref 3.8–10.5)
WBC # FLD AUTO: 5.98 K/UL — SIGNIFICANT CHANGE UP (ref 3.8–10.5)

## 2021-10-08 PROCEDURE — 99232 SBSQ HOSP IP/OBS MODERATE 35: CPT | Mod: GC

## 2021-10-08 PROCEDURE — 99223 1ST HOSP IP/OBS HIGH 75: CPT | Mod: GC

## 2021-10-08 PROCEDURE — 74177 CT ABD & PELVIS W/CONTRAST: CPT | Mod: 26

## 2021-10-08 PROCEDURE — 99233 SBSQ HOSP IP/OBS HIGH 50: CPT

## 2021-10-08 RX ORDER — SODIUM CHLORIDE 9 MG/ML
1000 INJECTION, SOLUTION INTRAVENOUS
Refills: 0 | Status: DISCONTINUED | OUTPATIENT
Start: 2021-10-08 | End: 2021-10-11

## 2021-10-08 RX ORDER — CAPSAICIN 0.025 %
1 CREAM (GRAM) TOPICAL
Refills: 0 | Status: DISCONTINUED | OUTPATIENT
Start: 2021-10-08 | End: 2021-10-13

## 2021-10-08 RX ORDER — ASPIRIN/CALCIUM CARB/MAGNESIUM 324 MG
81 TABLET ORAL DAILY
Refills: 0 | Status: DISCONTINUED | OUTPATIENT
Start: 2021-10-08 | End: 2021-10-10

## 2021-10-08 RX ADMIN — Medication 1 APPLICATION(S): at 18:02

## 2021-10-08 RX ADMIN — Medication 3 MILLIGRAM(S): at 23:04

## 2021-10-08 RX ADMIN — SENNA PLUS 1 TABLET(S): 8.6 TABLET ORAL at 07:15

## 2021-10-08 RX ADMIN — AMLODIPINE BESYLATE 2.5 MILLIGRAM(S): 2.5 TABLET ORAL at 07:14

## 2021-10-08 RX ADMIN — Medication 1 PACKET(S): at 11:56

## 2021-10-08 RX ADMIN — SODIUM CHLORIDE 100 MILLILITER(S): 9 INJECTION, SOLUTION INTRAVENOUS at 11:56

## 2021-10-08 RX ADMIN — Medication 650 MILLIGRAM(S): at 18:02

## 2021-10-08 RX ADMIN — Medication 650 MILLIGRAM(S): at 07:15

## 2021-10-08 RX ADMIN — Medication 650 MILLIGRAM(S): at 11:57

## 2021-10-08 RX ADMIN — TAMSULOSIN HYDROCHLORIDE 0.4 MILLIGRAM(S): 0.4 CAPSULE ORAL at 11:57

## 2021-10-08 RX ADMIN — FAMOTIDINE 20 MILLIGRAM(S): 10 INJECTION INTRAVENOUS at 11:56

## 2021-10-08 NOTE — PROGRESS NOTE ADULT - ASSESSMENT
89yoM  w PMH of LBP s/p L hip arthroplasty (ab 13 years ago), unspecified gallbadder disease s/p [facility], CAD, HTN, BPH who is p/w failure to thrive at home, decr PO intake, and worsening lower back pain. Recently completed 5 day course of keflex for a UTI. Cholestatic pattern liver test abnormalities concerning for worsening gallbladder ?disease (s/p bx, results unavailable for review as this was done [facility]) vs malignancy. Abd US obtained on admission with ascites and findings concerning for metastatic liver disease. [facility] obtained [facility] this hospitalization show prior alk phos elevated int he 400-600 range and Hgb baseline of 10.

## 2021-10-08 NOTE — DISCHARGE NOTE NURSING/CASE MANAGEMENT/SOCIAL WORK - PATIENT PORTAL LINK FT
You can access the FollowMyHealth Patient Portal offered by [facility] by registering at the following website: [email or web address]. By joining [facility]’s FollowMyHealth portal, you will also be able to view your health information using other applications (apps) compatible with our system.

## 2021-10-08 NOTE — DIETITIAN INITIAL EVALUATION ADULT. - ADD RECOMMEND
Honor food and beverage preferences within diet restriction of patient in an effort to maximize level of nutrient intake

## 2021-10-08 NOTE — PROGRESS NOTE ADULT - SUBJECTIVE AND OBJECTIVE BOX
Gastroenterology/Hepatology Progress Note      Interval Events:   - yesterday afternoon concern by team for chain in patient's mental status, not arousable to sternal rub, labs wnl  - this morning patient's mental status improved, no new complaints, states ongoing back pain  - AM labs w/ alk phos 1140->1091, otherwise stable    Allergies:  No Known Allergies      Hospital Medications:  acetaminophen   Tablet .. 650 milliGRAM(s) Oral every 6 hours  amLODIPine   Tablet 2.5 milliGRAM(s) Oral daily  dextrose 40% Gel 15 Gram(s) Oral once  dextrose 50% Injectable 25 Gram(s) IV Push once  dextrose 50% Injectable 12.5 Gram(s) IV Push once  dextrose 50% Injectable 25 Gram(s) IV Push once  famotidine    Tablet 20 milliGRAM(s) Oral daily  ferrous    sulfate 325 milliGRAM(s) Oral <User Schedule>  gabapentin 100 milliGRAM(s) Oral at bedtime  glucagon  Injectable 1 milliGRAM(s) IntraMuscular once  influenza   Vaccine 0.5 milliLiter(s) IntraMuscular once  lactated ringers. 1000 milliLiter(s) IV Continuous <Continuous>  melatonin 3 milliGRAM(s) Oral at bedtime  psyllium Powder 1 Packet(s) Oral daily  senna 1 Tablet(s) Oral two times a day  tamsulosin 0.4 milliGRAM(s) Oral daily        PHYSICAL EXAM:   Vital Signs:  Vital Signs Last 24 Hrs  T(C): 36.5 (08 Oct 2021 06:00), Max: 36.7 (08 Oct 2021 01:37)  T(F): 97.7 (08 Oct 2021 06:00), Max: 98.1 (08 Oct 2021 01:37)  HR: 87 (08 Oct 2021 06:00) (79 - 93)  BP: 128/74 (08 Oct 2021 06:00) (128/74 - 148/89)  BP(mean): --  RR: 16 (08 Oct 2021 06:00) (16 - 20)  SpO2: 98% (08 Oct 2021 06:00) (97% - 100%)  Daily     Daily     GENERAL:  No acute distress  HEENT:  NCAT, no scleral icterus  CHEST: no resp distress  HEART:  RRR  ABDOMEN:  Soft, non-tender, non-distended, normoactive bowel sounds, no masses, no hepato-splenomegaly, no signs of chronic liver disease  EXTREMITIES:  No cyanosis, clubbing, or edema  SKIN:  No rash/erythema/ecchymoses/petechiae/wounds/abscess/warm/dry  NEURO:  Alert and oriented x 3, no asterixis, no tremor    LABS:                        8.6    5.98  )-----------( 274      ( 08 Oct 2021 07:22 )             26.3     Mean Cell Volume: 96.0 fL (10-08-21 @ 07:22)    10-08    139  |  108<H>  |  23  ----------------------------<  81  4.1   |  21<L>  |  0.97    Ca    7.5<L>      08 Oct 2021 07:22  Phos  2.8     10-07  Mg     2.00     10-07    TPro  4.6<L>  /  Alb  2.2<L>  /  TBili  1.0  /  DBili  x   /  AST  34  /  ALT  18  /  AlkPhos  1091<H>  10-08    LIVER FUNCTIONS - ( 08 Oct 2021 07:22 )  Alb: 2.2 g/dL / Pro: 4.6 g/dL / ALK PHOS: 1091 U/L / ALT: 18 U/L / AST: 34 U/L / GGT: x           PT/INR - ( 06 Oct 2021 11:18 )   PT: 15.1 sec;   INR: 1.33 ratio         PTT - ( 06 Oct 2021 11:18 )  PTT:29.4 sec    Amylase Serum--      Lipase serum--       [id number]        Imaging:

## 2021-10-08 NOTE — DIETITIAN INITIAL EVALUATION ADULT. - ORAL INTAKE PTA/DIET HISTORY
Pt reports low po intake.  Offered food preferences but pt refused them.  Provided UBW- 135# one month ago.

## 2021-10-08 NOTE — PROGRESS NOTE ADULT - PROBLEM SELECTOR PLAN 5
Daughter states he has been on iron in the past but currently is not taking it. Unclear etiology of his anemia, but Hgb stable compared to Sept 2021 lab work. Pt not reporting any melena, hematochezia, hematuria. Iron profile, ferritin on admission consistent with anemia of chronic disease, however FIT from [facility] records was pos for occult blood, therefore suspect multifactorial etiology.   - ferrous sulfate q48 hr ordered this admission  - Daily CBC  - Type and screen

## 2021-10-08 NOTE — CONSULT NOTE ADULT - PROBLEM SELECTOR RECOMMENDATION 4
pt is dnr/dni  would rec home hospice but unable to reach daughter to discuss this option  did place referral to [facility] hospice to reach out to family so services can be instated in community if pt is discharged home  [facility] number is [phone] if family is reached so they can call and get more information  please call [phone] over weekend with any questions

## 2021-10-08 NOTE — PROGRESS NOTE ADULT - PROBLEM SELECTOR PLAN 1
Persistent. Cholestatic pattern liver injury on admission w alk phos 1100 and t bili 1.3. AST/ALT wnl. History notable for unclear gallbladder disease, s/[facility] and [facility]-- pt was told he did not have a malignancy at that time. Family requesting GI/Hepatology consult for further management/prognostication. Pt currently denying any abdominal pain.   - PT/INR daily  - Trend daily CMP  - CA-19 9, CEA, GGT ordered  - Hepatology consulted  - Palliative care consulted  - CT abd pelv w contrast 10/8 ordered   - Cont bowel regimen-- senna, metamucil

## 2021-10-08 NOTE — PROGRESS NOTE ADULT - PROBLEM SELECTOR PLAN 2
CT thoracic/lumbar w/o reviewed. No focal findings on MSKL exam on admission.  - PT/OT eval and treat  - 650 mg PO tylenol q 6 hrs scheduled for pain  - capsaicin cream ordered BID

## 2021-10-08 NOTE — PROGRESS NOTE ADULT - PROBLEM SELECTOR PLAN 3
Pt in bed for the past week predominately. Anorexia noted by family. Exam findings concerning for temporal wasting and trace LE edema. Albumin 2.5.  - Nutrition consult  - Case mgmt consult  - Regular diet with Ensure BID supplementation  - GOC discussed, see separate note. Pt DNR/DNI.  - Palliative care consulted Pt in bed for the past week predominately. Anorexia noted by family. Exam findings concerning for temporal wasting and trace LE edema. Albumin 2.5. Responded well to IVF-- suspect there was a component of dehydration on admission given decr PO intake for over a week per family.  - 100 cc/hr IVF LR x 5 hrs on 10/8  - Nutrition consult  - Case mgmt consult  - Regular diet with Ensure BID supplementation  - GOC discussed, see separate note. Pt DNR/DNI.  - Palliative care consulted

## 2021-10-08 NOTE — CONSULT NOTE ADULT - PROBLEM SELECTOR RECOMMENDATION 2
can give tylenol atc  can add low dose oxy ir 2.5mg po q 4 hours prn   bowel regimen with senna nightly

## 2021-10-08 NOTE — DIETITIAN INITIAL EVALUATION ADULT. - OTHER INFO
89yo M  admitted with worsening lower back pain and PMH of LBP s/p L hip arthroplasty (ab 13 years ago), unspecified gallbladder disease s/p [facility], CAD, HTN, BPH.  Pt lethargic, disoriented as per nursing flow sheet but provided UBW- 135# one month ago.  Current wt- 129.8# (10/8/21) via bed scale not zeroed, previous wt- 146.7# (5/23/2019) per HIE 2 years ago.  Noted wt. loss of 6#/4.4% in past one month. Pt reports to eat ~50% of his meals and 75% supplement Ensure Enlive x2 per day.    Pt with +1 edema to Rt & Lt Ankle and skin ecchymosis per nursing flow sheet.  Labs reviewed.  Receiving Fe supplementation due to chronic anemia. Pt on bowel regimen.

## 2021-10-08 NOTE — DIETITIAN NUTRITION RISK NOTIFICATION - TREATMENT: THE FOLLOWING DIET HAS BEEN RECOMMENDED
Diet, Dysphagia 1 Pureed-Thin Liquids:   Supplement Feeding Modality:  Oral  Ensure Enlive Servings Per Day:  1       Frequency:  Two Times a day (10-08-21 @ 09:26) [Active]

## 2021-10-08 NOTE — CONSULT NOTE ADULT - PROBLEM SELECTOR RECOMMENDATION 3
newly diagnosed on ct scan  attempted to contact daughter multiple times but no answer  would benefit from speak with family prior to discussing with patient  would not pursue further work up as pt is not a candidate for dmt

## 2021-10-08 NOTE — CONSULT NOTE ADULT - ASSESSMENT
89yoM  w PMH of LBP s/p L hip arthroplasty (ab 13 years ago), unspecified gallbadder disease s/p [facility], CAD, HTN, BPH who is p/w generalized malaise and worsening lower back pain. Presumed to have met pancreatic cancer.  asked to see for goc, pain

## 2021-10-08 NOTE — PROGRESS NOTE ADULT - SUBJECTIVE AND OBJECTIVE BOX
[facility] Division of Hospital Medicine  Kait Amaro MD  Pager (LYNN-F, 8A-5P): [phone]  Other Times:  [phone]      SUBJECTIVE / OVERNIGHT EVENTS:  Pt much improved compared to yesterday afternoon. Mental clarity improved, oriented to person, place, year, president. Conversing. Upset about hospital food. Back pain still bothering him, requesting to use "bengay cream." Had 1 BM overnight. Urinating without issue. No abd pain, hematuria, hematochezia, melena.    MEDICATIONS  (STANDING):  acetaminophen   Tablet .. 650 milliGRAM(s) Oral every 6 hours  amLODIPine   Tablet 2.5 milliGRAM(s) Oral daily  capsaicin HP 0.075% Cream 1 Application(s) Topical two times a day  dextrose 40% Gel 15 Gram(s) Oral once  dextrose 50% Injectable 25 Gram(s) IV Push once  dextrose 50% Injectable 12.5 Gram(s) IV Push once  dextrose 50% Injectable 25 Gram(s) IV Push once  famotidine    Tablet 20 milliGRAM(s) Oral daily  ferrous    sulfate 325 milliGRAM(s) Oral <User Schedule>  glucagon  Injectable 1 milliGRAM(s) IntraMuscular once  influenza   Vaccine 0.5 milliLiter(s) IntraMuscular once  lactated ringers. 1000 milliLiter(s) (100 mL/Hr) IV Continuous <Continuous>  lactated ringers. 1000 milliLiter(s) (100 mL/Hr) IV Continuous <Continuous>  melatonin 3 milliGRAM(s) Oral at bedtime  psyllium Powder 1 Packet(s) Oral daily  senna 1 Tablet(s) Oral two times a day  tamsulosin 0.4 milliGRAM(s) Oral daily    MEDICATIONS  (PRN):      I&O's Summary    07 Oct 2021 07:01  -  08 Oct 2021 07:00  --------------------------------------------------------  IN: 0 mL / OUT: 300 mL / NET: -300 mL        PHYSICAL EXAM:  Vital Signs Last 24 Hrs  T(C): 36.5 (08 Oct 2021 06:00), Max: 36.7 (08 Oct 2021 01:37)  T(F): 97.7 (08 Oct 2021 06:00), Max: 98.1 (08 Oct 2021 01:37)  HR: 87 (08 Oct 2021 06:00) (87 - 93)  BP: 128/74 (08 Oct 2021 06:00) (128/74 - 148/89)  BP(mean): --  RR: 16 (08 Oct 2021 06:00) (16 - 20)  SpO2: 98% (08 Oct 2021 06:00) (98% - 100%)  CONSTITUTIONAL: NAD, well-developed, well-groomed  EYES: PERRLA; conjunctiva and sclera clear  ENMT: Moist oral mucosa, no pharyngeal injection or exudates; normal dentition  RESPIRATORY: Normal respiratory effort; lungs are clear to auscultation bilaterally  CARDIOVASCULAR: Regular rate and rhythm, normal S1 and S2, no murmur/rub/gallop; No lower extremity edema; Peripheral pulses are 2+ bilaterally  ABDOMEN: Nontender to palpation, normoactive bowel sounds, no rebound/guarding; No hepatosplenomegaly  PSYCH: A+O to person, place, and time; affect appropriate  SKIN: No rashes; no palpable lesions    LABS:                        8.6    5.98  )-----------( 274      ( 08 Oct 2021 07:22 )             26.3     10-08    139  |  108<H>  |  23  ----------------------------<  81  4.1   |  21<L>  |  0.97    Ca    7.5<L>      08 Oct 2021 07:22  Phos  2.8     10-07  Mg     2.00     10-07    TPro  4.6<L>  /  Alb  2.2<L>  /  TBili  1.0  /  DBili  x   /  AST  34  /  ALT  18  /  AlkPhos  1091<H>  10-08      COORDINATION OF CARE:  Care Discussed with Consultants/Other Providers [Y/N]: Y Palliative and GI  Prior or Outpatient Records Reviewed [Y/N]: [facility] see [facility] chart note   [facility] Division of Hospital Medicine  Kait Amaro MD  Pager (LYNN-SANA, 8A-5P): [phone]  Other Times:  [phone]      SUBJECTIVE / OVERNIGHT EVENTS:  Pt much improved compared to yesterday afternoon. Robust clinical response to IVF. Mental clarity improved, oriented to person, place, year, president. Conversing. Upset about hospital food. Back pain still bothering him, requesting to use "bengay cream." Had 1 BM overnight. Urinating without issue. No abd pain, hematuria, hematochezia, melena.    MEDICATIONS  (STANDING):  acetaminophen   Tablet .. 650 milliGRAM(s) Oral every 6 hours  amLODIPine   Tablet 2.5 milliGRAM(s) Oral daily  capsaicin HP 0.075% Cream 1 Application(s) Topical two times a day  dextrose 40% Gel 15 Gram(s) Oral once  dextrose 50% Injectable 25 Gram(s) IV Push once  dextrose 50% Injectable 12.5 Gram(s) IV Push once  dextrose 50% Injectable 25 Gram(s) IV Push once  famotidine    Tablet 20 milliGRAM(s) Oral daily  ferrous    sulfate 325 milliGRAM(s) Oral <User Schedule>  glucagon  Injectable 1 milliGRAM(s) IntraMuscular once  influenza   Vaccine 0.5 milliLiter(s) IntraMuscular once  lactated ringers. 1000 milliLiter(s) (100 mL/Hr) IV Continuous <Continuous>  lactated ringers. 1000 milliLiter(s) (100 mL/Hr) IV Continuous <Continuous>  melatonin 3 milliGRAM(s) Oral at bedtime  psyllium Powder 1 Packet(s) Oral daily  senna 1 Tablet(s) Oral two times a day  tamsulosin 0.4 milliGRAM(s) Oral daily    MEDICATIONS  (PRN):      I&O's Summary    07 Oct 2021 07:01  -  08 Oct 2021 07:00  --------------------------------------------------------  IN: 0 mL / OUT: 300 mL / NET: -300 mL        PHYSICAL EXAM:  Vital Signs Last 24 Hrs  T(C): 36.5 (08 Oct 2021 06:00), Max: 36.7 (08 Oct 2021 01:37)  T(F): 97.7 (08 Oct 2021 06:00), Max: 98.1 (08 Oct 2021 01:37)  HR: 87 (08 Oct 2021 06:00) (87 - 93)  BP: 128/74 (08 Oct 2021 06:00) (128/74 - 148/89)  BP(mean): --  RR: 16 (08 Oct 2021 06:00) (16 - 20)  SpO2: 98% (08 Oct 2021 06:00) (98% - 100%)  CONSTITUTIONAL: NAD, cachectic  EYES: PERRLA; conjunctiva and sclera clear  ENMT: Moist oral mucosa, no pharyngeal injection or exudates; poor dentition  RESPIRATORY: Normal respiratory effort; bibasilar crackles appreciated, stable  CARDIOVASCULAR: Regular rate and rhythm, normal S1 and S2, no murmur/rub/gallop; No lower extremity edema; Peripheral pulses are 2+ bilaterally  ABDOMEN: Nontender to palpation, normoactive bowel sounds, no rebound/guarding; No hepatosplenomegaly  PSYCH: A+O to person, place, and time; affect appropriate  SKIN: No rashes; no palpable lesions    LABS:                        8.6    5.98  )-----------( 274      ( 08 Oct 2021 07:22 )             26.3     10-08    139  |  108<H>  |  23  ----------------------------<  81  4.1   |  21<L>  |  0.97    Ca    7.5<L>      08 Oct 2021 07:22  Phos  2.8     10-07  Mg     2.00     10-07    TPro  4.6<L>  /  Alb  2.2<L>  /  TBili  1.0  /  DBili  x   /  AST  34  /  ALT  18  /  AlkPhos  1091<H>  10-08      COORDINATION OF CARE:  Care Discussed with Consultants/Other Providers [Y/N]: Y Palliative and GI  Prior or Outpatient Records Reviewed [Y/N]: [facility] see [facility] chart note

## 2021-10-08 NOTE — DIETITIAN INITIAL EVALUATION ADULT. - PERTINENT MEDS FT
MEDICATIONS  (STANDING):  acetaminophen   Tablet .. 650 milliGRAM(s) Oral every 6 hours  amLODIPine   Tablet 2.5 milliGRAM(s) Oral daily  aspirin  chewable 81 milliGRAM(s) Oral daily  capsaicin HP 0.075% Cream 1 Application(s) Topical two times a day  dextrose 40% Gel 15 Gram(s) Oral once  dextrose 50% Injectable 25 Gram(s) IV Push once  dextrose 50% Injectable 12.5 Gram(s) IV Push once  dextrose 50% Injectable 25 Gram(s) IV Push once  famotidine    Tablet 20 milliGRAM(s) Oral daily  ferrous    sulfate 325 milliGRAM(s) Oral <User Schedule>  glucagon  Injectable 1 milliGRAM(s) IntraMuscular once  influenza   Vaccine 0.5 milliLiter(s) IntraMuscular once  lactated ringers. 1000 milliLiter(s) (100 mL/Hr) IV Continuous <Continuous>  lactated ringers. 1000 milliLiter(s) (100 mL/Hr) IV Continuous <Continuous>  melatonin 3 milliGRAM(s) Oral at bedtime  psyllium Powder 1 Packet(s) Oral daily  senna 1 Tablet(s) Oral two times a day  tamsulosin 0.4 milliGRAM(s) Oral daily

## 2021-10-08 NOTE — DIETITIAN INITIAL EVALUATION ADULT. - PERTINENT LABORATORY DATA
10-08 Na 139 mmol/L Glu 81 mg/dL K+ 4.1 mmol/L Cr 0.97 mg/dL BUN 23 mg/dL Phos n/a    10-07 @ 15:28 POCT 85 mg/dL

## 2021-10-08 NOTE — PROGRESS NOTE ADULT - ASSESSMENT
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 2020 in a hospital in [place]) , CAD, HTN, BPH who is p/w generalized malaise and worsening lower back pain. History obtained from patient and then from patient's daughter, Anna. He states that he has had worsening LBP that is causing difficulty ambulating resulting in him being in bed for most of the day for the past week. Notably he was recently treated for a UTI w a 5 day cousrse of keflex from the ED, end date of 10/1. Pt reports no significant improvement in his pain after the course of antibiotics and is currently denying any dysuria, abd pain, fevers, chills, polyuria. Pt's daughter lives with him and helps him walk to and from the bathroom, but otherwise he has been bedridden for over 1 week. Endorses decreased appetite.    # elevated liver enzymes - cholestatic picture, elevated alk phos, T bili wnl, etiology possibly 2/2 biliary tract stricture/stent occlusion (unlikely) vs liver malignancy (US suggesting possible scattered hypoechoic lesions) vs non-liver etiology for elevated alk phos    Recommendations:  - trend CMP, INR qd  - f/u GGT and fractionated alk phos  - f/u family decision regarding further workup,  - rest of care per primary team    Hepatology will continue to follow.     Valente Cedeno, PGY5  Gastroenterology/Hepatology Fellow  Available on [employer] Teams  [phone] ([employer] Short Range Pager)  [phone] (Long Range Pager)    After 5pm, please contact the on-call GI fellow. [phone]   89 yo M  w PMH of LBP s/p L hip arthroplasty (ab 13 years ago), unspecified gallbadder disease s/p biliary stent placement ( ? 2020 in a hospital in [place]) , CAD, HTN, BPH who is p/w generalized malaise and worsening lower back pain. History obtained from patient and then from patient's daughter, Anna. He states that he has had worsening LBP that is causing difficulty ambulating resulting in him being in bed for most of the day for the past week. Notably he was recently treated for a UTI w a 5 day cousrse of keflex from the ED, end date of 10/1. Pt reports no significant improvement in his pain after the course of antibiotics and is currently denying any dysuria, abd pain, fevers, chills, polyuria. Pt's daughter lives with him and helps him walk to and from the bathroom, but otherwise he has been bedridden for over 1 week. Endorses decreased appetite.    # elevated liver enzymes - cholestatic picture, elevated alk phos, T bili wnl, etiology possibly 2/2 biliary tract stricture/stent occlusion (unlikely) vs liver malignancy (US suggesting possible scattered hypoechoic lesions) vs non-liver etiology for elevated alk phos    Recommendations:  - trend CMP, INR qd  - f/u GGT and fractionated alk phos  - CT A/P triple phase to further evaluate liver lesions   - f/u family decision regarding further workup,  - rest of care per primary team    Hepatology will continue to follow.     Valente Cedeno, PGY5  Gastroenterology/Hepatology Fellow  Available on [employer] Teams  [phone] ([employer] Short Range Pager)  [phone] (Long Range Pager)    After 5pm, please contact the on-call GI fellow. [phone]

## 2021-10-08 NOTE — PROGRESS NOTE ADULT - ATTENDING COMMENTS
Patient with elevated alk phos explained by biliary stent in place with known biliary pathology. No biliary obstruction now and no evidence of cholangitis.  Patient to have CT scan to assess possible liver masses. If masses present discussion with family to determine plan of action.  No complaints of abdominal pain.

## 2021-10-08 NOTE — CONSULT NOTE ADULT - SUBJECTIVE AND OBJECTIVE BOX
Gastroenterology Consultation:    Patient is a 89y old  Male who presents with a chief complaint of lower back pain, worsening (06 Oct 2021 10:17)      Admitted on: 10-06-21  HPI:  89 yo M  w PMH of LBP s/p L hip arthroplasty (ab 13 years ago), unspecified gallbadder disease s/p biliary stent placement ( ? 2020 in a hospital in [place]) , CAD, HTN, BPH who is p/w generalized malaise and worsening lower back pain. History obtained from patient and then from patient's daughter, Anna. He states that he has had worsening LBP that is causing difficulty ambulating resulting in him being in bed for most of the day for the past week. Notably he was recently treated for a UTI w a 5 day cousrse of keflex from the ED, end date of 10/1. Pt reports no significant improvement in his pain after the course of antibiotics and is currently denying any dysuria, abd pain, fevers, chills, polyuria. Pt's daughter lives with him and helps him walk to and from the bathroom, but otherwise he has been bedridden for over 1 week. Endorses decreased appetite.     Pt's daughter also able to provide further medical history regarding his gallbladder disease-  states that 3 years ago he was told he had [facility]bladder [facility], however on further evaluation of pathology, he was told it most likely was NOT cancer. She also states he had "fluid surrounding his lungs" at that time that they elected to not further evaluate.       Prior records Reviewed (Y/N): Y  History obtained from person other than patient (Y/N): Y, daughter , Anna          PAST MEDICAL & SURGICAL HISTORY:  Hypertension    Hyperlipidemia    BPH (Benign Prostatic Hyperplasia)    Gall bladder disease    CAD (coronary artery disease)    Hernia of abdominal cavity  60 years ago    History of biliary stent insertion    History of arthroplasty of left hip        FAMILY HISTORY:  FH: HTN (hypertension) (Child)        Social History:  Tobacco: N  Alcohol: N  Drugs: N    Home Medications:  amLODIPine 2.5 mg oral tablet: 1 tab(s) orally once a day (06 Oct 2021 10:58)  aspirin 81 mg oral tablet: orally once a day (06 Oct 2021 10:58)  famotidine 20 mg oral tablet, disintegrating: orally once a day (06 Oct 2021 10:58)  gabapentin 100 mg oral tablet: orally once a day (at bedtime) (06 Oct 2021 10:58)  Metamucil 3.4 g/3.7 g oral powder for reconstitution: orally once a day (06 Oct 2021 10:58)  tamsulosin 0.4 mg oral capsule: 1 cap(s) orally once a day (06 Oct 2021 10:58)    MEDICATIONS  (STANDING):  amLODIPine   Tablet 2.5 milliGRAM(s) Oral daily  aspirin  chewable 81 milliGRAM(s) Oral daily  enoxaparin Injectable 40 milliGRAM(s) SubCutaneous daily  famotidine    Tablet 20 milliGRAM(s) Oral daily  ferrous    sulfate 325 milliGRAM(s) Oral <User Schedule>  gabapentin 100 milliGRAM(s) Oral at bedtime  influenza   Vaccine 0.5 milliLiter(s) IntraMuscular once  melatonin 3 milliGRAM(s) Oral at bedtime  psyllium Powder 1 Packet(s) Oral daily  senna 1 Tablet(s) Oral two times a day  tamsulosin 0.4 milliGRAM(s) Oral daily    MEDICATIONS  (PRN):  acetaminophen   Tablet .. 650 milliGRAM(s) Oral every 6 hours PRN Temp greater or equal to 38C (100.4F), Mild Pain (1 - 3)      Allergies  No Known Allergies      Review of Systems:   Constitutional:  No Fever, No Chills  ENT/Mouth:  No Hearing Changes,  No Difficulty Swallowing  Eyes:  No Eye Pain, No Vision Changes  Cardiovascular:  No Chest Pain, No Palpitations  Respiratory:  No Cough, No Dyspnea  Gastrointestinal:  As described in HPI  Musculoskeletal:  No Joint Swelling, No Back Pain  Skin:  No Skin Lesions, No Jaundice  Neuro:  No Syncope, No Dizziness  Heme/Lymph:  No Bruising, No Bleeding.          Physical Examination:  T(C): 36.4 (10-07-21 @ 05:34), Max: 37 (10-06-21 @ 14:56)  HR: 82 (10-07-21 @ 05:34) (82 - 98)  BP: 135/73 (10-07-21 @ 05:34) (118/73 - 135/73)  RR: 18 (10-07-21 @ 05:34) (18 - 19)  SpO2: 97% (10-07-21 @ 05:34) (97% - 100%)      10-06-21 @ 07:01  -  10-07-21 @ 07:00  --------------------------------------------------------  IN: 290 mL / OUT: 250 mL / NET: 40 mL        Constitutional: No acute distress.  Eyes:. Conjunctivae are clear, Sclera is non-icteric.  Ears Nose and Throat: The external ears are normal appearing,  Oral mucosa is pink and moist.  Respiratory:  No signs of respiratory distress. Lung sounds are clear bilaterally.  Cardiovascular:  S1 S2, Regular rate and rhythm.  GI: Abdomen is soft, symmetric, and non-tender without distention. There are no visible lesions. Bowel sounds are present and normoactive in all four quadrants. No masses, hepatomegaly, or splenomegaly are noted.   Neuro: No Tremor, No involuntary movements  Skin: No rashes, No Jaundice.          Data: (reviewed by attending)                        8.5    6.05  )-----------( 285      ( 07 Oct 2021 07:24 )             25.6     Hgb Trend:  8.5  10-07-21 @ 07:24  9.3  10-05-21 @ 21:02        10-07    140  |  108<H>  |  24<H>  ----------------------------<  105<H>  4.2   |  23  |  1.04    Ca    7.7<L>      07 Oct 2021 07:24  Phos  2.8     10-07  Mg     2.00     10-07    TPro  5.0<L>  /  Alb  2.3<L>  /  TBili  1.1  /  DBili  0.7<H>  /  AST  30  /  ALT  20  /  AlkPhos  1113<H>  10-07    Liver panel trend:  TBili 1.1   /   AST 30   /   ALT 20   /   AlkP 1113   /   Tptn 5.0   /   Alb 2.3    /   DBili 0.7      10-07  TBili 1.3   /   AST 28   /   ALT 22   /   AlkP 1129   /   Tptn 5.4   /   Alb 2.5    /   DBili --      10-05      PT/INR - ( 06 Oct 2021 11:18 )   PT: 15.1 sec;   INR: 1.33 ratio         PTT - ( 06 Oct 2021 11:18 )  PTT:29.4 sec        Radiology:(reviewed by attending)    US Abdomen Complete:   EXAM:  US ABDOMEN COMPLETE        PROCEDURE DATE:  Oct  6 2021         INTERPRETATION:  CLINICAL INFORMATION: Elevated alkaline phosphatase. Biliary stent placement in 2018.    COMPARISON: 6/2/2015.    TECHNIQUE: Sonography of the abdomen.    FINDINGS:    Liver: Nodular hepatic contour with diffusely heterogeneous parenchyma. Suggestion of scattered hypoechoic lesions.  Bile ducts: Stent in place. Pneumobilia.  Gallbladder: Not visualized.  Pancreas: Visualized portions are within normal limits.  Spleen: 8.8 cm. Within normal limits.  Right kidney: 8.0 cm. No hydronephrosis.  Left kidney: 8.0 cm.  No hydronephrosis.  Urinary bladder: Within normal limits.  Ascites: Moderate amount of free fluid in the right upper quadrant.  Aorta and IVC: Visualized portions are within normal limits.    IMPRESSION:  Findings are suspicious for metastatic disease to the liver. Small to moderate ascites. A contrast-enhanced CT or MRI is advised for further evaluation.        --- End of Report ---              KHUSHI ANTOINE MD; Attending Radiologist  This document has been electronically signed. Oct  6 2021  3:40PM (10-06-21 @ 13:47)    
HPI:  89yoM  w PMH of LBP s/p L hip arthroplasty (ab 13 years ago), unspecified gallbadder disease s/p [facility], CAD, HTN, BPH who is p/w generalized malaise and worsening lower back pain. History obtained from patient and then from patient's daughter, Anna. He states that he has had worsening LBP that is causing difficulty ambulating resulting in him being in bed for most of the day for the past week. Notably he was recently treated for a UTI w a 5 day cousrse of keflex from the ED, end date of 10/1. Pt reports no significant improvement in his pain after the course of antibiotics and is currently denying any dysuria, abd pain, fevers, chills, polyuria. Pt's daughter lives with him and helps him walk to and from the bathroom, but otherwise he has been bedridden for over 1 week. Endorses decreased appetite. For pain mgmt, daughter has been giving him two 650 mg tablets once daily prn for pain.     Pt's daughter also able to provide further medical history regarding his gallbladder disease-- states that 3 years ago he was told he had gallbladder cancer [facility], however on further evaluation of pathology, he was told it most likely was NOT cancer. She also states he had "fluid surrounding his lungs" at that time that they elected to not further evaluate. (06 Oct 2021 10:17)    Pt awake, alert.  wishes to use miguel alanis for pain and tylenol.  otherwise has no complaints.  wishes to go home    PERTINENT PM/SXH:   Hypertension    Hyperlipidemia    BPH (Benign Prostatic Hyperplasia)    Gall bladder disease    CAD (coronary artery disease)      Hernia of abdominal cavity    History of biliary stent insertion    History of arthroplasty of left hip      FAMILY HISTORY:  FH: HTN (hypertension) (Child)      ITEMS NOT CHECKED ARE NOT PRESENT    SOCIAL HISTORY:   Significant other/partner:  [ ]  Children:  [x ]  Denominational/Spirituality:  Substance hx:  [ ]   Tobacco hx:  [ ]   Alcohol hx: [ ]   Home Opioid hx:  [ ] I-Stop Reference No:  Living Situation: [ x]Home  [ ]Long term care  [ ]Rehab [ ]Other    ADVANCE DIRECTIVES:    DNR  MOLST  [ ]  Living Will  [ ]   DECISION MAKER(s):  [ ] Health Care Proxy(s)  [ ] Surrogate(s)  [ ] Guardian           Name(s): Phone Number(s): Anna    BASELINE (I)ADL(s) (prior to admission):  Bacon: [ ]Total  [x ] Moderate [ ]Dependent    Allergies    No Known Allergies    Intolerances    MEDICATIONS  (STANDING):  acetaminophen   Tablet .. 650 milliGRAM(s) Oral every 6 hours  amLODIPine   Tablet 2.5 milliGRAM(s) Oral daily  aspirin  chewable 81 milliGRAM(s) Oral daily  capsaicin HP 0.075% Cream 1 Application(s) Topical two times a day  dextrose 40% Gel 15 Gram(s) Oral once  dextrose 50% Injectable 25 Gram(s) IV Push once  dextrose 50% Injectable 12.5 Gram(s) IV Push once  dextrose 50% Injectable 25 Gram(s) IV Push once  famotidine    Tablet 20 milliGRAM(s) Oral daily  ferrous    sulfate 325 milliGRAM(s) Oral <User Schedule>  glucagon  Injectable 1 milliGRAM(s) IntraMuscular once  influenza   Vaccine 0.5 milliLiter(s) IntraMuscular once  lactated ringers. 1000 milliLiter(s) (100 mL/Hr) IV Continuous <Continuous>  lactated ringers. 1000 milliLiter(s) (100 mL/Hr) IV Continuous <Continuous>  melatonin 3 milliGRAM(s) Oral at bedtime  psyllium Powder 1 Packet(s) Oral daily  senna 1 Tablet(s) Oral two times a day  tamsulosin 0.4 milliGRAM(s) Oral daily    MEDICATIONS  (PRN):    PRESENT SYMPTOMS: [ ]Unable to obtain due to poor mentation   Source if other than patient:  [ ]Family   [ ]Team     Pain: [x ] yes [ ] no  QOL impact - inability to lay or stand for long period of time  Location -    back                 Aggravating factors - none  Quality - dull  Radiation - none  Timing- comes and goes  Severity (0-10 scale): 4/10  Minimal acceptable level (0-10 scale): 3/10    PAIN AD Score:     [email or web address].[email or web address]/painad.pdf (press ctrl +  left click to view)    Dyspnea:                           [ ]Mild [ ]Moderate [ ]Severe  Anxiety:                             [ ]Mild [ ]Moderate [ ]Severe  Fatigue:                             [ ]Mild [ ]Moderate [ ]Severe  Nausea:                             [ ]Mild [ ]Moderate [ ]Severe  Loss of appetite:              [ ]Mild [ ]Moderate [ ]Severe  Constipation:                    [ ]Mild [ ]Moderate [ ]Severe    Other Symptoms:  [x ]All other review of systems negative     Karnofsky Performance Score/Palliative Performance Status Version 2:     60    %    http://npcrc.org/files/news/palliative_performance_scale_ppsv2.pdf  PHYSICAL EXAM:  Vital Signs Last 24 Hrs  T(C): 36.3 (08 Oct 2021 15:25), Max: 36.7 (08 Oct 2021 01:37)  T(F): 97.3 (08 Oct 2021 15:25), Max: 98.1 (08 Oct 2021 01:37)  HR: 100 (08 Oct 2021 15:25) (87 - 100)  BP: 119/80 (08 Oct 2021 15:25) (119/80 - 148/89)  BP(mean): --  RR: 18 (08 Oct 2021 15:25) (16 - 20)  SpO2: 98% (08 Oct 2021 15:25) (98% - 100%) I&O's Summary    07 Oct 2021 07:01  -  08 Oct 2021 07:00  --------------------------------------------------------  IN: 0 mL / OUT: 300 mL / NET: -300 mL    GENERAL:  [x ]Alert  [x ]Oriented x 3  [ ]Lethargic  [ ]Cachexia  [ ]Unarousable  [ ]Verbal  [ ]Non-Verbal  Behavioral:   [ ] Anxiety  [ ] Delirium [ ] Agitation [ ] Other  HEENT:  [x ]Normal   [ ]Dry mouth   [ ]ET Tube/Trach  [ ]Oral lesions  PULMONARY:   [x ]Clear [ ]Tachypnea  [ ]Audible excessive secretions   [ ]Rhonchi        [ ]Right [ ]Left [ ]Bilateral  [ ]Crackles        [ ]Right [ ]Left [ ]Bilateral  [ ]Wheezing     [ ]Right [ ]Left [ ]Bilateral  CARDIOVASCULAR:    [ x]Regular [ ]Irregular [ ]Tachy  [ ]Reuben [ ]Murmur [ ]Other  GASTROINTESTINAL:  [x ]Soft  [ ]Distended   [x ]+BS  [ x]Non tender [ ]Tender  [ ]PEG [ ]OGT/ NGT  Last BM: GENITOURINARY:  [ ]Normal [x ] Incontinent   [ ]Oliguria/Anuria   [ ]Payan  MUSCULOSKELETAL:   [ ]Normal   [x ]Weakness  [ ]Bed/Wheelchair bound [ ]Edema  NEUROLOGIC:   [ x]No focal deficits  [ ] Cognitive impairment  [ ] Dysphagia [ ]Dysarthria [ ] Paresis [ ]Other   SKIN:   [ x]Normal   [ ]Pressure ulcer(s)  [ ]Rash    CRITICAL CARE:  [ ] Shock Present  [ ]Septic [ ]Cardiogenic [ ]Neurologic [ ]Hypovolemic  [ ]  Vasopressors [ ]  Inotropes   [ ] Respiratory failure present [ ] mechanical ventilation [ ] non-invasive ventilatory support [ ] High flow  [ ] Acute  [ ] Chronic [ ] Hypoxic  [ ] Hypercarbic [ ] Other  [ ] Other organ failure     LABS:                        8.6    5.98  )-----------( 274      ( 08 Oct 2021 07:22 )             26.3   10-08    139  |  108<H>  |  23  ----------------------------<  81  4.1   |  21<L>  |  0.97    Ca    7.5<L>      08 Oct 2021 07:22  Phos  2.8     10-07  Mg     2.00     10-07    TPro  4.6<L>  /  Alb  2.2<L>  /  TBili  1.0  /  DBili  x   /  AST  34  /  ALT  18  /  AlkPhos  1091<H>  10-08        RADIOLOGY & ADDITIONAL STUDIES:    PROTEIN CALORIE MALNUTRITION PRESENT: [ ] Yes [ ] No  [ ] PPSV2 < or = to 30% [ ] significant weight loss  [ ] poor nutritional intake [ ] catabolic state [ ] anasarca     Artificial Nutrition [ ]     REFERRALS:   [ ]Chaplaincy  [ ] Hospice  [ ]Child Life  [ ]Social Work  [ ]Case management [ ]Holistic Therapy     Goals of Care Document:   Care Coordination Assessment 201 [C. Provider] (10-07-21 @ 12:07)   Progress Notes - Care Coordination [C. Provider] (10-08-21 @ 15:54)

## 2021-10-09 DIAGNOSIS — Z29.9 ENCOUNTER FOR PROPHYLACTIC MEASURES, UNSPECIFIED: ICD-10-CM

## 2021-10-09 DIAGNOSIS — I81 PORTAL VEIN THROMBOSIS: ICD-10-CM

## 2021-10-09 DIAGNOSIS — C25.9 MALIGNANT NEOPLASM OF PANCREAS, UNSPECIFIED: ICD-10-CM

## 2021-10-09 LAB
HCT VFR BLD CALC: 27.6 % — LOW (ref 39–50)
HGB BLD-MCNC: 9.1 G/DL — LOW (ref 13–17)
MCHC RBC-ENTMCNC: 30.7 PG — SIGNIFICANT CHANGE UP (ref 27–34)
MCHC RBC-ENTMCNC: 33 GM/DL — SIGNIFICANT CHANGE UP (ref 32–36)
MCV RBC AUTO: 93.2 FL — SIGNIFICANT CHANGE UP (ref 80–100)
NRBC # BLD: 0 /100 WBCS — SIGNIFICANT CHANGE UP
NRBC # FLD: 0 K/UL — SIGNIFICANT CHANGE UP
PLATELET # BLD AUTO: 300 K/UL — SIGNIFICANT CHANGE UP (ref 150–400)
RBC # BLD: 2.96 M/UL — LOW (ref 4.2–5.8)
RBC # FLD: 18.4 % — HIGH (ref 10.3–14.5)
WBC # BLD: 7.05 K/UL — SIGNIFICANT CHANGE UP (ref 3.8–10.5)
WBC # FLD AUTO: 7.05 K/UL — SIGNIFICANT CHANGE UP (ref 3.8–10.5)

## 2021-10-09 PROCEDURE — 99233 SBSQ HOSP IP/OBS HIGH 50: CPT

## 2021-10-09 RX ORDER — ENOXAPARIN SODIUM 100 MG/ML
40 INJECTION SUBCUTANEOUS DAILY
Refills: 0 | Status: DISCONTINUED | OUTPATIENT
Start: 2021-10-09 | End: 2021-10-14

## 2021-10-09 RX ADMIN — Medication 650 MILLIGRAM(S): at 18:44

## 2021-10-09 RX ADMIN — TAMSULOSIN HYDROCHLORIDE 0.4 MILLIGRAM(S): 0.4 CAPSULE ORAL at 11:15

## 2021-10-09 RX ADMIN — FAMOTIDINE 20 MILLIGRAM(S): 10 INJECTION INTRAVENOUS at 11:14

## 2021-10-09 RX ADMIN — Medication 1 APPLICATION(S): at 06:43

## 2021-10-09 RX ADMIN — Medication 650 MILLIGRAM(S): at 04:51

## 2021-10-09 RX ADMIN — Medication 650 MILLIGRAM(S): at 11:15

## 2021-10-09 RX ADMIN — Medication 1 PACKET(S): at 11:15

## 2021-10-09 RX ADMIN — Medication 325 MILLIGRAM(S): at 06:42

## 2021-10-09 RX ADMIN — AMLODIPINE BESYLATE 2.5 MILLIGRAM(S): 2.5 TABLET ORAL at 06:42

## 2021-10-09 RX ADMIN — Medication 3 MILLIGRAM(S): at 22:00

## 2021-10-09 RX ADMIN — Medication 81 MILLIGRAM(S): at 11:14

## 2021-10-09 RX ADMIN — Medication 1 APPLICATION(S): at 18:02

## 2021-10-09 NOTE — PROGRESS NOTE ADULT - SUBJECTIVE AND OBJECTIVE BOX
Patient is a 89y old  Male who presents with a chief complaint of lower back pain, worsening (08 Oct 2021 16:36)    Jenaro Beach MD   [facility]   Pager [phone]    SUBJECTIVE / OVERNIGHT EVENTS:  Patient seen and examined this morning. He reports that he is feeling well, looking forwards to going home. No SOB, CP.    MEDICATIONS  (STANDING):  acetaminophen   Tablet .. 650 milliGRAM(s) Oral every 6 hours  amLODIPine   Tablet 2.5 milliGRAM(s) Oral daily  aspirin  chewable 81 milliGRAM(s) Oral daily  capsaicin HP 0.075% Cream 1 Application(s) Topical two times a day  dextrose 40% Gel 15 Gram(s) Oral once  dextrose 50% Injectable 25 Gram(s) IV Push once  dextrose 50% Injectable 12.5 Gram(s) IV Push once  dextrose 50% Injectable 25 Gram(s) IV Push once  famotidine    Tablet 20 milliGRAM(s) Oral daily  ferrous    sulfate 325 milliGRAM(s) Oral <User Schedule>  glucagon  Injectable 1 milliGRAM(s) IntraMuscular once  influenza   Vaccine 0.5 milliLiter(s) IntraMuscular once  lactated ringers. 1000 milliLiter(s) (100 mL/Hr) IV Continuous <Continuous>  lactated ringers. 1000 milliLiter(s) (100 mL/Hr) IV Continuous <Continuous>  melatonin 3 milliGRAM(s) Oral at bedtime  psyllium Powder 1 Packet(s) Oral daily  senna 1 Tablet(s) Oral two times a day  tamsulosin 0.4 milliGRAM(s) Oral daily    MEDICATIONS  (PRN):      Vital Signs Last 24 Hrs  T(C): 36.5 (09 Oct 2021 12:11), Max: 37 (08 Oct 2021 23:01)  T(F): 97.7 (09 Oct 2021 12:11), Max: 98.6 (08 Oct 2021 23:01)  HR: 99 (09 Oct 2021 12:11) (99 - 100)  BP: 121/77 (09 Oct 2021 12:11) (115/76 - 121/77)  BP(mean): --  RR: 19 (09 Oct 2021 12:11) (18 - 19)  SpO2: 98% (09 Oct 2021 12:11) (98% - 99%)  CAPILLARY BLOOD GLUCOSE      POCT Blood Glucose.: 86 mg/dL (09 Oct 2021 12:05)    I&O's Summary      Constitutional: elderly man in bed NAD, awake and alert  EYES: PERRLA, normal sclera  HEENT:  NCAT, moist oral mucosa    Respiratory: no respiratory distress, Breath sounds are clear bilaterally. No wheezing, rales or rhonchi  Cardiovascular: S1 and S2, regular rate and rhythm, no murmurs. peripheral 2+ in BLEs  Gastrointestinal: Soft, nontender, nondistended. +BS  Extremities: trace lower extremity edema, no calf tenderness, warm to touch  Skin: No rashes, No erythema   Psych:  normal mood, normal affect    LABS:                        9.1    7.05  )-----------( 300      ( 09 Oct 2021 07:17 )             27.6     10-08    139  |  108<H>  |  23  ----------------------------<  81  4.1   |  21<L>  |  0.97    Ca    7.5<L>      08 Oct 2021 07:22    TPro  4.6<L>  /  Alb  2.2<L>  /  TBili  1.0  /  DBili  x   /  AST  34  /  ALT  18  /  AlkPhos  1091<H>  10-08              RADIOLOGY & ADDITIONAL TESTS:    Imaging Personally Reviewed:    Consultant(s) Notes Reviewed:      Care Discussed with Consultants/Other Providers:

## 2021-10-09 NOTE — PROGRESS NOTE ADULT - PROBLEM SELECTOR PLAN 1
# R/o pancreatic cancer  - CT abdomen showing findings highly concerning for pancreatic cancer and pneumobilia and intrahepatic biliary ductal dilatation in the left and right hepatic lobes.  - CA 19-9 - 9118  - Hepatology consulted  - Palliative care consulted

## 2021-10-09 NOTE — PROGRESS NOTE ADULT - PROBLEM SELECTOR PLAN 2
CT showed right portal venous thrombosis and prominent dilatation of the left portal vein  AC has not formally been discussed with family, however does not appear to be inline with prior GOC discussions

## 2021-10-09 NOTE — PROGRESS NOTE ADULT - PROBLEM SELECTOR PLAN 8
DVTppx: lovenox 40mg   Diet: regular + ensure  Code: DNR/DNI -pending hospice evaluation   Dispo: ?home with hospice

## 2021-10-09 NOTE — PROGRESS NOTE ADULT - NSPROGADDITIONALINFOA_GEN_ALL_CORE
Called daughter twice this morning to discuss CT results and GOC. VM left and am currently awaiting call back.

## 2021-10-10 ENCOUNTER — TRANSCRIPTION ENCOUNTER (OUTPATIENT)
Age: 86
End: 2021-10-10

## 2021-10-10 LAB
BLD GP AB SCN SERPL QL: NEGATIVE — SIGNIFICANT CHANGE UP
HCT VFR BLD CALC: 26.4 % — LOW (ref 39–50)
HCT VFR BLD CALC: 27.9 % — LOW (ref 39–50)
HGB BLD-MCNC: 8.9 G/DL — LOW (ref 13–17)
HGB BLD-MCNC: 9.4 G/DL — LOW (ref 13–17)
MCHC RBC-ENTMCNC: 31.4 PG — SIGNIFICANT CHANGE UP (ref 27–34)
MCHC RBC-ENTMCNC: 31.4 PG — SIGNIFICANT CHANGE UP (ref 27–34)
MCHC RBC-ENTMCNC: 33.7 GM/DL — SIGNIFICANT CHANGE UP (ref 32–36)
MCHC RBC-ENTMCNC: 33.7 GM/DL — SIGNIFICANT CHANGE UP (ref 32–36)
MCV RBC AUTO: 93.3 FL — SIGNIFICANT CHANGE UP (ref 80–100)
MCV RBC AUTO: 93.3 FL — SIGNIFICANT CHANGE UP (ref 80–100)
NRBC # BLD: 0 /100 WBCS — SIGNIFICANT CHANGE UP
NRBC # BLD: 0 /100 WBCS — SIGNIFICANT CHANGE UP
NRBC # FLD: 0 K/UL — SIGNIFICANT CHANGE UP
NRBC # FLD: 0 K/UL — SIGNIFICANT CHANGE UP
PLATELET # BLD AUTO: 282 K/UL — SIGNIFICANT CHANGE UP (ref 150–400)
PLATELET # BLD AUTO: 349 K/UL — SIGNIFICANT CHANGE UP (ref 150–400)
RBC # BLD: 2.83 M/UL — LOW (ref 4.2–5.8)
RBC # BLD: 2.99 M/UL — LOW (ref 4.2–5.8)
RBC # FLD: 18.1 % — HIGH (ref 10.3–14.5)
RBC # FLD: 18.3 % — HIGH (ref 10.3–14.5)
RH IG SCN BLD-IMP: POSITIVE — SIGNIFICANT CHANGE UP
WBC # BLD: 6.3 K/UL — SIGNIFICANT CHANGE UP (ref 3.8–10.5)
WBC # BLD: 7.16 K/UL — SIGNIFICANT CHANGE UP (ref 3.8–10.5)
WBC # FLD AUTO: 6.3 K/UL — SIGNIFICANT CHANGE UP (ref 3.8–10.5)
WBC # FLD AUTO: 7.16 K/UL — SIGNIFICANT CHANGE UP (ref 3.8–10.5)

## 2021-10-10 PROCEDURE — 99497 ADVNCD CARE PLAN 30 MIN: CPT | Mod: 25

## 2021-10-10 PROCEDURE — 99232 SBSQ HOSP IP/OBS MODERATE 35: CPT

## 2021-10-10 RX ORDER — OXYCODONE HYDROCHLORIDE 5 MG/1
2.5 TABLET ORAL EVERY 4 HOURS
Refills: 0 | Status: DISCONTINUED | OUTPATIENT
Start: 2021-10-10 | End: 2021-10-11

## 2021-10-10 RX ORDER — SENNA PLUS 8.6 MG/1
2 TABLET ORAL
Qty: 0 | Refills: 0 | DISCHARGE
Start: 2021-10-10

## 2021-10-10 RX ORDER — LIDOCAINE 4 G/100G
1 CREAM TOPICAL EVERY 24 HOURS
Refills: 0 | Status: DISCONTINUED | OUTPATIENT
Start: 2021-10-10 | End: 2021-10-14

## 2021-10-10 RX ORDER — ACETAMINOPHEN 500 MG
2 TABLET ORAL
Qty: 0 | Refills: 0 | DISCHARGE
Start: 2021-10-10

## 2021-10-10 RX ORDER — KETOROLAC TROMETHAMINE 30 MG/ML
30 SYRINGE (ML) INJECTION ONCE
Refills: 0 | Status: DISCONTINUED | OUTPATIENT
Start: 2021-10-10 | End: 2021-10-11

## 2021-10-10 RX ORDER — SENNA PLUS 8.6 MG/1
1 TABLET ORAL
Qty: 0 | Refills: 0 | DISCHARGE
Start: 2021-10-10

## 2021-10-10 RX ADMIN — LIDOCAINE 1 PATCH: 4 CREAM TOPICAL at 10:43

## 2021-10-10 RX ADMIN — Medication 1 PACKET(S): at 12:03

## 2021-10-10 RX ADMIN — ENOXAPARIN SODIUM 40 MILLIGRAM(S): 100 INJECTION SUBCUTANEOUS at 12:03

## 2021-10-10 RX ADMIN — FAMOTIDINE 20 MILLIGRAM(S): 10 INJECTION INTRAVENOUS at 12:02

## 2021-10-10 RX ADMIN — Medication 650 MILLIGRAM(S): at 06:07

## 2021-10-10 RX ADMIN — Medication 650 MILLIGRAM(S): at 12:03

## 2021-10-10 RX ADMIN — Medication 650 MILLIGRAM(S): at 13:00

## 2021-10-10 RX ADMIN — OXYCODONE HYDROCHLORIDE 2.5 MILLIGRAM(S): 5 TABLET ORAL at 19:15

## 2021-10-10 RX ADMIN — OXYCODONE HYDROCHLORIDE 2.5 MILLIGRAM(S): 5 TABLET ORAL at 18:15

## 2021-10-10 RX ADMIN — Medication 1 APPLICATION(S): at 06:08

## 2021-10-10 RX ADMIN — Medication 1 APPLICATION(S): at 17:45

## 2021-10-10 RX ADMIN — TAMSULOSIN HYDROCHLORIDE 0.4 MILLIGRAM(S): 0.4 CAPSULE ORAL at 12:03

## 2021-10-10 RX ADMIN — Medication 81 MILLIGRAM(S): at 12:03

## 2021-10-10 RX ADMIN — AMLODIPINE BESYLATE 2.5 MILLIGRAM(S): 2.5 TABLET ORAL at 06:06

## 2021-10-10 RX ADMIN — Medication 650 MILLIGRAM(S): at 17:45

## 2021-10-10 RX ADMIN — Medication 650 MILLIGRAM(S): at 06:32

## 2021-10-10 RX ADMIN — LIDOCAINE 1 PATCH: 4 CREAM TOPICAL at 18:28

## 2021-10-10 RX ADMIN — SENNA PLUS 1 TABLET(S): 8.6 TABLET ORAL at 06:06

## 2021-10-10 NOTE — PROGRESS NOTE ADULT - CONVERSATION DETAILS
Discussed suspected diagnosis of pancreatic cancer and potential workup. They do not want any aggressive measures and would not want diagnosis as would not want surgery or chemo regardless. Their goal is to return home where patient can be comfortable. Anna said she had a call from hospice team and will reach back to them after our conversation.

## 2021-10-10 NOTE — DISCHARGE NOTE PROVIDER - DETAILS OF MALNUTRITION DIAGNOSIS/DIAGNOSES
This patient has been assessed with a concern for Malnutrition and was treated during this hospitalization for the following Nutrition diagnosis/diagnoses:     -  10/08/2021: Severe protein-calorie malnutrition

## 2021-10-10 NOTE — PROGRESS NOTE ADULT - PROBLEM SELECTOR PLAN 1
# R/o pancreatic cancer  - CT abdomen showing findings highly concerning for pancreatic cancer and pneumobilia and intrahepatic biliary ductal dilatation in the left and right hepatic lobes.  - CA 19-9 - 8601  - Hepatology consulted  - Palliative care consulted # R/o pancreatic cancer  - CT abdomen showing findings highly concerning for pancreatic cancer and pneumobilia and intrahepatic biliary ductal dilatation in the left and right hepatic lobes.  - CA 19-9 - 1522  - Hepatology consulted, patient now declining further workup as not in line with his GO  - Palliative care consulted and will plan for hospice

## 2021-10-10 NOTE — PROGRESS NOTE ADULT - SUBJECTIVE AND OBJECTIVE BOX
Patient is a 89y old  Male who presents with a chief complaint of lower back pain, worsening (10 Oct 2021 07:14)    Jenaro Beach MD   [facility]   Pager [phone]    SUBJECTIVE / OVERNIGHT EVENTS:  Patient seen and examined today.    MEDICATIONS  (STANDING):  acetaminophen   Tablet .. 650 milliGRAM(s) Oral every 6 hours  amLODIPine   Tablet 2.5 milliGRAM(s) Oral daily  aspirin  chewable 81 milliGRAM(s) Oral daily  capsaicin HP 0.075% Cream 1 Application(s) Topical two times a day  dextrose 40% Gel 15 Gram(s) Oral once  dextrose 50% Injectable 25 Gram(s) IV Push once  dextrose 50% Injectable 12.5 Gram(s) IV Push once  dextrose 50% Injectable 25 Gram(s) IV Push once  enoxaparin Injectable 40 milliGRAM(s) SubCutaneous daily  famotidine    Tablet 20 milliGRAM(s) Oral daily  glucagon  Injectable 1 milliGRAM(s) IntraMuscular once  influenza   Vaccine 0.5 milliLiter(s) IntraMuscular once  lactated ringers. 1000 milliLiter(s) (100 mL/Hr) IV Continuous <Continuous>  lactated ringers. 1000 milliLiter(s) (100 mL/Hr) IV Continuous <Continuous>  melatonin 3 milliGRAM(s) Oral at bedtime  psyllium Powder 1 Packet(s) Oral daily  senna 1 Tablet(s) Oral two times a day  tamsulosin 0.4 milliGRAM(s) Oral daily    MEDICATIONS  (PRN):  oxyCODONE    IR 2.5 milliGRAM(s) Oral every 4 hours PRN Moderate Pain - severe pain      Vital Signs Last 24 Hrs  T(C): 36.4 (10 Oct 2021 06:00), Max: 36.5 (09 Oct 2021 12:11)  T(F): 97.5 (10 Oct 2021 06:00), Max: 97.7 (09 Oct 2021 12:11)  HR: 99 (10 Oct 2021 06:00) (97 - 99)  BP: 125/91 (10 Oct 2021 06:00) (119/78 - 133/90)  BP(mean): --  RR: 16 (10 Oct 2021 06:00) (16 - 19)  SpO2: 98% (10 Oct 2021 06:00) (97% - 98%)  CAPILLARY BLOOD GLUCOSE      POCT Blood Glucose.: 86 mg/dL (09 Oct 2021 12:05)    I&O's Summary    09 Oct 2021 07:01  -  10 Oct 2021 07:00  --------------------------------------------------------  IN: 350 mL / OUT: 150 mL / NET: 200 mL        Constitutional: elderly man in bed NAD, awake and alert  EYES: PERRLA, normal sclera  HEENT:  NCAT, moist oral mucosa    Respiratory: no respiratory distress, Breath sounds are clear bilaterally. No wheezing, rales or rhonchi  Cardiovascular: S1 and S2, regular rate and rhythm, no murmurs. peripheral 2+ in BLEs  Gastrointestinal: Soft, nontender, nondistended. +BS  Extremities: trace lower extremity edema, no calf tenderness, warm to touch  Skin: No rashes, No erythema   Psych:  normal mood, normal affect    LABS:                        8.9    7.16  )-----------( 282      ( 10 Oct 2021 06:34 )             26.4                     RADIOLOGY & ADDITIONAL TESTS:    Imaging Personally Reviewed:    Consultant(s) Notes Reviewed:      Care Discussed with Consultants/Other Providers:   Patient is a 89y old  Male who presents with a chief complaint of lower back pain, worsening (10 Oct 2021 07:14)    Jenaro Beach MD   [facility]   Pager [phone]    SUBJECTIVE / OVERNIGHT EVENTS:  Patient seen and examined today. Some lower back pain today, no other events.     MEDICATIONS  (STANDING):  acetaminophen   Tablet .. 650 milliGRAM(s) Oral every 6 hours  amLODIPine   Tablet 2.5 milliGRAM(s) Oral daily  aspirin  chewable 81 milliGRAM(s) Oral daily  capsaicin HP 0.075% Cream 1 Application(s) Topical two times a day  dextrose 40% Gel 15 Gram(s) Oral once  dextrose 50% Injectable 25 Gram(s) IV Push once  dextrose 50% Injectable 12.5 Gram(s) IV Push once  dextrose 50% Injectable 25 Gram(s) IV Push once  enoxaparin Injectable 40 milliGRAM(s) SubCutaneous daily  famotidine    Tablet 20 milliGRAM(s) Oral daily  glucagon  Injectable 1 milliGRAM(s) IntraMuscular once  influenza   Vaccine 0.5 milliLiter(s) IntraMuscular once  lactated ringers. 1000 milliLiter(s) (100 mL/Hr) IV Continuous <Continuous>  lactated ringers. 1000 milliLiter(s) (100 mL/Hr) IV Continuous <Continuous>  melatonin 3 milliGRAM(s) Oral at bedtime  psyllium Powder 1 Packet(s) Oral daily  senna 1 Tablet(s) Oral two times a day  tamsulosin 0.4 milliGRAM(s) Oral daily    MEDICATIONS  (PRN):  oxyCODONE    IR 2.5 milliGRAM(s) Oral every 4 hours PRN Moderate Pain - severe pain      Vital Signs Last 24 Hrs  T(C): 36.4 (10 Oct 2021 06:00), Max: 36.5 (09 Oct 2021 12:11)  T(F): 97.5 (10 Oct 2021 06:00), Max: 97.7 (09 Oct 2021 12:11)  HR: 99 (10 Oct 2021 06:00) (97 - 99)  BP: 125/91 (10 Oct 2021 06:00) (119/78 - 133/90)  BP(mean): --  RR: 16 (10 Oct 2021 06:00) (16 - 19)  SpO2: 98% (10 Oct 2021 06:00) (97% - 98%)  CAPILLARY BLOOD GLUCOSE      POCT Blood Glucose.: 86 mg/dL (09 Oct 2021 12:05)    I&O's Summary    09 Oct 2021 07:01  -  10 Oct 2021 07:00  --------------------------------------------------------  IN: 350 mL / OUT: 150 mL / NET: 200 mL        Constitutional: elderly man in bed NAD, awake and alert  EYES: PERRLA, normal sclera  HEENT:  NCAT, moist oral mucosa    Respiratory: no respiratory distress, Breath sounds are clear bilaterally. No wheezing, rales or rhonchi  Cardiovascular: S1 and S2, regular rate and rhythm, no murmurs. peripheral 2+ in BLEs  Gastrointestinal: Soft, nontender, nondistended. +BS  Extremities: trace lower extremity edema, no calf tenderness, warm to touch  Skin: No rashes, No erythema   Psych:  normal mood, normal affect    LABS:                        8.9    7.16  )-----------( 282      ( 10 Oct 2021 06:34 )             26.4                     RADIOLOGY & ADDITIONAL TESTS:    Imaging Personally Reviewed:    Consultant(s) Notes Reviewed:      Care Discussed with Consultants/Other Providers:

## 2021-10-10 NOTE — PROGRESS NOTE ADULT - PROBLEM SELECTOR PLAN 8
DVTppx: lovenox 40mg   Diet: regular + ensure  Code: DNR/DNI -pending hospice evaluation   Dispo: ?home with hospice DVTppx: lovenox 40mg   Diet: regular + ensure  Code: DNR/DNI -pending signed hospice consent  Dispo: home with hospice

## 2021-10-10 NOTE — DISCHARGE NOTE PROVIDER - NSDCCPCAREPLAN_GEN_ALL_CORE_FT
PRINCIPAL DISCHARGE DIAGNOSIS  Diagnosis: Low back pain  Assessment and Plan of Treatment: 650 mg PO tylenol every 6 hrs scheduled for pain  capsaicin cream twice daily      SECONDARY DISCHARGE DIAGNOSES  Diagnosis: Adult failure to thrive  Assessment and Plan of Treatment:     Diagnosis: Alkaline phosphatase elevation  Assessment and Plan of Treatment:      PRINCIPAL DISCHARGE DIAGNOSIS  Diagnosis: Low back pain  Assessment and Plan of Treatment: 650 mg PO tylenol every 6 hrs scheduled for pain  capsaicin cream twice daily      SECONDARY DISCHARGE DIAGNOSES  Diagnosis: Pancreatic adenocarcinoma  Assessment and Plan of Treatment: You are being discharged with Hospice services and it is recommended to focus on comfort measures and supportive care. Continue with medications prescribed for pain and other symptom control. If you have questions or concerns you may contact the Hospice service line by calling [phone].    Diagnosis: Urinary tract infection  Assessment and Plan of Treatment: You were started on antibiotics for a urinary infection. To help prevent future infections maintain healthy hygiene and avoid taking long baths. Empty your bladder frequently by keeping yourself properly hydrated.   Monitor for signs/symptoms of infection, such as, fever/chills, burning/pain with urination, urinary frequency/hesitancy, cloudy urine, or blood in urine and follow-up with your primary care provider as outpatient for further care.

## 2021-10-10 NOTE — PROGRESS NOTE ADULT - PROBLEM SELECTOR PLAN 2
CT showed right portal venous thrombosis and prominent dilatation of the left portal vein  AC discussed with patient and family and believe and everyone agrees that starting systemic anticoagulation would not be in line with GOC and would be to risky given his tendency to bleed off any blood thinners and fall risk. CT showed right portal venous thrombosis and prominent dilatation of the left portal vein  AC discussed with patient and daughter; everyone agrees that starting systemic anticoagulation would not be in line with GOC and would be to risky given his tendency to bleed off any blood thinners and his fall risk.

## 2021-10-10 NOTE — DISCHARGE NOTE PROVIDER - HOSPITAL COURSE
89yoM  w PMH of LBP s/p L hip arthroplasty (ab 13 years ago), unspecified gallbadder disease s/[facility], CAD, HTN, BPH who is p/w failure to thrive at home, decr PO intake, and worsening lower back pain. Recently completed 5 day course of keflex for a UTI. Cholestatic pattern liver test abnormalities concerning for worsening gallbladder ?disease (s/p bx, results unavailable for review as this [facility]) vs malignancy. Abd US obtained on admission with ascites and findings concerning for metastatic liver disease. [facility] this [facility] show prior alk phos elevated int he 400-600 range and Hgb baseline of 10. Hepatology was consulted and recommended CT for further evaluation. CT abdomen was performed showing findings highly concerning for pancreatic cancer and pneumobilia and intrahepatic biliary ductal dilatation in the left and right hepatic lobes. CA 19-9 - 7322. Hepatology recommending hospice as now treatment being offered given advanced age and comorbidities. CT showed right portal venous thrombosis and prominent dilatation of the left portal vein. AC has not formally been discussed with family, however does not appear to be inline with prior GOC discussions. Palliative was consulted for assistance with GOC. Pt DNR/DNI, hospice referral made.  mg PO tylenol q 6 hrs scheduled for lower back pain and capsaicin cream ordered BID.           89yoM  w PMH of LBP s/p L hip arthroplasty (ab 13 years ago), unspecified gallbadder disease s/p biliary [facility], CAD, HTN, BPH a/w failure to thrive at home, decr PO intake, and worsening lower back pain. Recently completed 5 day course of keflex for a UTI. Cholestatic pattern liver test abnormalities concerning for worsening gallbladder ?disease (s/p bx, results unavailable for review as [facility]) vs malignancy. Abd US obtained on admission with ascites and findings concerning for metastatic liver disease.     Hepatology was consulted and recommended CT for further evaluation. CT abdomen was performed showing findings highly concerning for pancreatic cancer and pneumobilia and intrahepatic biliary ductal dilatation in the left and right hepatic lobes. CA 19-9 - 7322. Patient/daughter not in favor of further workup/treatment, DNR ([place] completed).  CT showed right portal venous thrombosis and prominent dilatation of the left portal vein. AC not in line with GOC.     Pt also noted to have bloody BM 10/9, Hgb with slight decrease but stable, no further bloody BM.  ASA stopped.  Pat's pain well controlled, plan for MOHSEN then likely transition to home hospice.

## 2021-10-10 NOTE — DISCHARGE NOTE PROVIDER - NSDCMRMEDTOKEN_GEN_ALL_CORE_FT
acetaminophen 325 mg oral tablet: 2 tab(s) orally every 6 hours  amLODIPine 2.5 mg oral tablet: 1 tab(s) orally once a day  aspirin 81 mg oral tablet: orally once a day  famotidine 20 mg oral tablet, disintegrating: orally once a day  gabapentin 100 mg oral tablet: orally once a day (at bedtime)  Metamucil 3.4 g/3.7 g oral powder for reconstitution: orally once a day  senna oral tablet: 1 tab(s) orally 2 times a day  tamsulosin 0.4 mg oral capsule: 1 cap(s) orally once a day   acetaminophen 325 mg oral tablet: 2 tab(s) orally every 6 hours  amLODIPine 2.5 mg oral tablet: 1 tab(s) orally once a day  famotidine 20 mg oral tablet, disintegrating: orally once a day  lidocaine 4% topical film: Apply topically to affected area every 24 hours  Metamucil 3.4 g/3.7 g oral powder for reconstitution: orally once a day  oxyCODONE 5 mg oral tablet: 0.5 tab(s) orally every 4 hours  senna oral tablet: 1 tab(s) orally 2 times a day  tamsulosin 0.4 mg oral capsule: 1 cap(s) orally once a day

## 2021-10-10 NOTE — PROGRESS NOTE ADULT - PROBLEM SELECTOR PLAN 3
CT thoracic/lumbar w/o reviewed. No focal findings on MSKL exam on admission.  - PT/OT eval and treat  - 650 mg PO tylenol q 6 hrs scheduled for pain  - capsaicin cream ordered BID CT thoracic/lumbar w/o reviewed. No focal findings on MSKL exam on admission.  - PT/OT eval and treat  - 650 mg PO tylenol q 6 hrs scheduled for pain  - capsaicin cream ordered BID  - oxy 2.5 PRN

## 2021-10-11 LAB
HCT VFR BLD CALC: 25.9 % — LOW (ref 39–50)
HGB BLD-MCNC: 8.6 G/DL — LOW (ref 13–17)
MCHC RBC-ENTMCNC: 31.7 PG — SIGNIFICANT CHANGE UP (ref 27–34)
MCHC RBC-ENTMCNC: 33.2 GM/DL — SIGNIFICANT CHANGE UP (ref 32–36)
MCV RBC AUTO: 95.6 FL — SIGNIFICANT CHANGE UP (ref 80–100)
NRBC # BLD: 0 /100 WBCS — SIGNIFICANT CHANGE UP
NRBC # FLD: 0 K/UL — SIGNIFICANT CHANGE UP
PLATELET # BLD AUTO: 307 K/UL — SIGNIFICANT CHANGE UP (ref 150–400)
RBC # BLD: 2.71 M/UL — LOW (ref 4.2–5.8)
RBC # FLD: 18.6 % — HIGH (ref 10.3–14.5)
WBC # BLD: 6.53 K/UL — SIGNIFICANT CHANGE UP (ref 3.8–10.5)
WBC # FLD AUTO: 6.53 K/UL — SIGNIFICANT CHANGE UP (ref 3.8–10.5)

## 2021-10-11 PROCEDURE — 99232 SBSQ HOSP IP/OBS MODERATE 35: CPT

## 2021-10-11 RX ORDER — OXYCODONE HYDROCHLORIDE 5 MG/1
2.5 TABLET ORAL EVERY 4 HOURS
Refills: 0 | Status: DISCONTINUED | OUTPATIENT
Start: 2021-10-11 | End: 2021-10-12

## 2021-10-11 RX ADMIN — OXYCODONE HYDROCHLORIDE 2.5 MILLIGRAM(S): 5 TABLET ORAL at 14:04

## 2021-10-11 RX ADMIN — Medication 650 MILLIGRAM(S): at 18:02

## 2021-10-11 RX ADMIN — Medication 650 MILLIGRAM(S): at 18:32

## 2021-10-11 RX ADMIN — OXYCODONE HYDROCHLORIDE 2.5 MILLIGRAM(S): 5 TABLET ORAL at 09:12

## 2021-10-11 RX ADMIN — OXYCODONE HYDROCHLORIDE 2.5 MILLIGRAM(S): 5 TABLET ORAL at 18:03

## 2021-10-11 RX ADMIN — Medication 650 MILLIGRAM(S): at 11:26

## 2021-10-11 RX ADMIN — Medication 3 MILLIGRAM(S): at 22:04

## 2021-10-11 RX ADMIN — OXYCODONE HYDROCHLORIDE 2.5 MILLIGRAM(S): 5 TABLET ORAL at 14:34

## 2021-10-11 RX ADMIN — Medication 650 MILLIGRAM(S): at 06:50

## 2021-10-11 RX ADMIN — AMLODIPINE BESYLATE 2.5 MILLIGRAM(S): 2.5 TABLET ORAL at 06:50

## 2021-10-11 RX ADMIN — OXYCODONE HYDROCHLORIDE 2.5 MILLIGRAM(S): 5 TABLET ORAL at 22:04

## 2021-10-11 RX ADMIN — Medication 1 APPLICATION(S): at 18:02

## 2021-10-11 RX ADMIN — ENOXAPARIN SODIUM 40 MILLIGRAM(S): 100 INJECTION SUBCUTANEOUS at 11:27

## 2021-10-11 RX ADMIN — FAMOTIDINE 20 MILLIGRAM(S): 10 INJECTION INTRAVENOUS at 11:27

## 2021-10-11 RX ADMIN — TAMSULOSIN HYDROCHLORIDE 0.4 MILLIGRAM(S): 0.4 CAPSULE ORAL at 11:27

## 2021-10-11 RX ADMIN — OXYCODONE HYDROCHLORIDE 2.5 MILLIGRAM(S): 5 TABLET ORAL at 22:34

## 2021-10-11 RX ADMIN — OXYCODONE HYDROCHLORIDE 2.5 MILLIGRAM(S): 5 TABLET ORAL at 08:42

## 2021-10-11 RX ADMIN — Medication 650 MILLIGRAM(S): at 11:56

## 2021-10-11 RX ADMIN — Medication 1 APPLICATION(S): at 06:50

## 2021-10-11 RX ADMIN — OXYCODONE HYDROCHLORIDE 2.5 MILLIGRAM(S): 5 TABLET ORAL at 18:33

## 2021-10-11 NOTE — PROGRESS NOTE ADULT - PROBLEM SELECTOR PLAN 5
- likely due to suspected pancreatic cancer   - Regular diet with Ensure BID supplementation  - GOC discussed, see separate note. Pt DNR/DNI.  - Palliative care consulted, planning for hospice

## 2021-10-11 NOTE — PROGRESS NOTE ADULT - PROBLEM SELECTOR PLAN 1
R/o pancreatic cancer  - CT abdomen showing findings highly concerning for pancreatic cancer and pneumobilia and intrahepatic biliary ductal dilatation in the left and right hepatic lobes.  - CA 19-9 - 1322  - Hepatology consulted, patient now declining further workup as not in line with his GO  - Palliative care consulted and will plan for Home hospice  - Pain control

## 2021-10-11 NOTE — PROGRESS NOTE ADULT - SUBJECTIVE AND OBJECTIVE BOX
Patient is a 89y old  Male who presents with a chief complaint of lower back pain, worsening (10 Oct 2021 08:18)      SUBJECTIVE / OVERNIGHT EVENTS: pt reports uncontrolled pain yesterday but pain much improved today.  No further BMs this am per RN  ADDITIONAL REVIEW OF SYSTEMS:    MEDICATIONS  (STANDING):  acetaminophen   Tablet .. 650 milliGRAM(s) Oral every 6 hours  capsaicin HP 0.075% Cream 1 Application(s) Topical two times a day  dextrose 40% Gel 15 Gram(s) Oral once  dextrose 50% Injectable 25 Gram(s) IV Push once  dextrose 50% Injectable 12.5 Gram(s) IV Push once  dextrose 50% Injectable 25 Gram(s) IV Push once  enoxaparin Injectable 40 milliGRAM(s) SubCutaneous daily  famotidine    Tablet 20 milliGRAM(s) Oral daily  glucagon  Injectable 1 milliGRAM(s) IntraMuscular once  influenza   Vaccine 0.5 milliLiter(s) IntraMuscular once  melatonin 3 milliGRAM(s) Oral at bedtime  oxyCODONE    IR 2.5 milliGRAM(s) Oral every 4 hours  tamsulosin 0.4 milliGRAM(s) Oral daily    MEDICATIONS  (PRN):  lidocaine   4% Patch 1 Patch Transdermal every 24 hours PRN back pain      CAPILLARY BLOOD GLUCOSE      POCT Blood Glucose.: 105 mg/dL (11 Oct 2021 11:50)    I&O's Summary    10 Oct 2021 07:01  -  11 Oct 2021 07:00  --------------------------------------------------------  IN: 650 mL / OUT: 200 mL / NET: 450 mL        PHYSICAL EXAM:  Vital Signs Last 24 Hrs  T(C): 36.5 (11 Oct 2021 10:10), Max: 36.7 (10 Oct 2021 20:48)  T(F): 97.7 (11 Oct 2021 10:10), Max: 98 (10 Oct 2021 20:48)  HR: 90 (11 Oct 2021 10:10) (90 - 99)  BP: 104/66 (11 Oct 2021 10:10) (104/66 - 122/79)  BP(mean): --  RR: 19 (11 Oct 2021 10:10) (19 - 20)  SpO2: 97% (11 Oct 2021 10:10) (96% - 97%)  CONSTITUTIONAL: NAD, cachectic  RESPIRATORY: Normal respiratory effort; lungs are clear to auscultation bilaterally  CARDIOVASCULAR: Regular rate and rhythm, normal S1 and S2  ABDOMEN: Nontender to palpation, normoactive bowel sounds  MUSCLOSKELETAL: no clubbing or cyanosis of digits  PSYCH: Awake, alert    LABS:                        8.6    6.53  )-----------( 307      ( 11 Oct 2021 06:31 )             25.9                       RADIOLOGY & ADDITIONAL TESTS:  Results Reviewed:   Imaging Personally Reviewed:  Electrocardiogram Personally Reviewed:    COORDINATION OF CARE:  Care Discussed with Consultants/Other Providers [Y/N]: Dr. Christensen ([facility]), continue with pain management  Prior or Outpatient Records Reviewed [Y/N]:

## 2021-10-11 NOTE — PROGRESS NOTE ADULT - PROBLEM SELECTOR PLAN 8
DVTppx: lovenox 40mg   Diet: regular + ensure  Code: DNR/DNI -pending signed hospice consent  Dispo: home with hospice

## 2021-10-11 NOTE — PROGRESS NOTE ADULT - PROBLEM SELECTOR PLAN 3
CT showed right portal venous thrombosis and prominent dilatation of the left portal vein  AC discussed with patient and daughter; everyone agrees that starting systemic anticoagulation would not be in line with GOC and would be to risky given his tendency to bleed off any blood thinners and his fall risk.

## 2021-10-11 NOTE — PROGRESS NOTE ADULT - PROBLEM SELECTOR PLAN 2
- labs consistent with AOCD in setting of likely malignancy   - Additionally with Bloody BM 10/10, Hgb ok, no further episodes.  Monitor.  ASA DC'd

## 2021-10-12 LAB
HCT VFR BLD CALC: 24.1 % — LOW (ref 39–50)
HGB BLD-MCNC: 8 G/DL — LOW (ref 13–17)
MCHC RBC-ENTMCNC: 31.4 PG — SIGNIFICANT CHANGE UP (ref 27–34)
MCHC RBC-ENTMCNC: 33.2 GM/DL — SIGNIFICANT CHANGE UP (ref 32–36)
MCV RBC AUTO: 94.5 FL — SIGNIFICANT CHANGE UP (ref 80–100)
NRBC # BLD: 0 /100 WBCS — SIGNIFICANT CHANGE UP
NRBC # FLD: 0 K/UL — SIGNIFICANT CHANGE UP
PLATELET # BLD AUTO: 293 K/UL — SIGNIFICANT CHANGE UP (ref 150–400)
RBC # BLD: 2.55 M/UL — LOW (ref 4.2–5.8)
RBC # FLD: 18.6 % — HIGH (ref 10.3–14.5)
WBC # BLD: 6.01 K/UL — SIGNIFICANT CHANGE UP (ref 3.8–10.5)
WBC # FLD AUTO: 6.01 K/UL — SIGNIFICANT CHANGE UP (ref 3.8–10.5)

## 2021-10-12 PROCEDURE — 99232 SBSQ HOSP IP/OBS MODERATE 35: CPT

## 2021-10-12 RX ADMIN — Medication 650 MILLIGRAM(S): at 23:30

## 2021-10-12 RX ADMIN — OXYCODONE HYDROCHLORIDE 2.5 MILLIGRAM(S): 5 TABLET ORAL at 18:31

## 2021-10-12 RX ADMIN — Medication 3 MILLIGRAM(S): at 23:00

## 2021-10-12 RX ADMIN — OXYCODONE HYDROCHLORIDE 2.5 MILLIGRAM(S): 5 TABLET ORAL at 02:04

## 2021-10-12 RX ADMIN — OXYCODONE HYDROCHLORIDE 2.5 MILLIGRAM(S): 5 TABLET ORAL at 06:34

## 2021-10-12 RX ADMIN — OXYCODONE HYDROCHLORIDE 2.5 MILLIGRAM(S): 5 TABLET ORAL at 10:09

## 2021-10-12 RX ADMIN — Medication 650 MILLIGRAM(S): at 00:00

## 2021-10-12 RX ADMIN — Medication 650 MILLIGRAM(S): at 06:03

## 2021-10-12 RX ADMIN — Medication 650 MILLIGRAM(S): at 18:31

## 2021-10-12 RX ADMIN — FAMOTIDINE 20 MILLIGRAM(S): 10 INJECTION INTRAVENOUS at 11:59

## 2021-10-12 RX ADMIN — Medication 1 APPLICATION(S): at 06:04

## 2021-10-12 RX ADMIN — Medication 650 MILLIGRAM(S): at 18:59

## 2021-10-12 RX ADMIN — Medication 650 MILLIGRAM(S): at 11:58

## 2021-10-12 RX ADMIN — OXYCODONE HYDROCHLORIDE 2.5 MILLIGRAM(S): 5 TABLET ORAL at 09:50

## 2021-10-12 RX ADMIN — ENOXAPARIN SODIUM 40 MILLIGRAM(S): 100 INJECTION SUBCUTANEOUS at 11:59

## 2021-10-12 RX ADMIN — OXYCODONE HYDROCHLORIDE 2.5 MILLIGRAM(S): 5 TABLET ORAL at 06:04

## 2021-10-12 RX ADMIN — Medication 650 MILLIGRAM(S): at 00:30

## 2021-10-12 RX ADMIN — Medication 650 MILLIGRAM(S): at 06:33

## 2021-10-12 RX ADMIN — OXYCODONE HYDROCHLORIDE 2.5 MILLIGRAM(S): 5 TABLET ORAL at 15:02

## 2021-10-12 RX ADMIN — TAMSULOSIN HYDROCHLORIDE 0.4 MILLIGRAM(S): 0.4 CAPSULE ORAL at 11:59

## 2021-10-12 RX ADMIN — Medication 650 MILLIGRAM(S): at 23:00

## 2021-10-12 RX ADMIN — OXYCODONE HYDROCHLORIDE 2.5 MILLIGRAM(S): 5 TABLET ORAL at 02:34

## 2021-10-12 RX ADMIN — Medication 1 APPLICATION(S): at 18:59

## 2021-10-12 NOTE — PROGRESS NOTE ADULT - PROBLEM SELECTOR PLAN 2
- labs consistent with AOCD in setting of likely malignancy   - Additionally with Bloody BM 10/10, Hgb downtrending.  Monitor.  No further episodes.  ASA DC'd

## 2021-10-12 NOTE — PROGRESS NOTE ADULT - SUBJECTIVE AND OBJECTIVE BOX
Patient is a 89y old  Male who presents with a chief complaint of lower back pain, worsening (11 Oct 2021 15:53)      SUBJECTIVE / OVERNIGHT EVENTS: no events.  no further bloody BM per patient/RN.  States pain better controlled  ADDITIONAL REVIEW OF SYSTEMS:    MEDICATIONS  (STANDING):  acetaminophen   Tablet .. 650 milliGRAM(s) Oral every 6 hours  capsaicin HP 0.075% Cream 1 Application(s) Topical two times a day  dextrose 40% Gel 15 Gram(s) Oral once  dextrose 50% Injectable 25 Gram(s) IV Push once  dextrose 50% Injectable 12.5 Gram(s) IV Push once  dextrose 50% Injectable 25 Gram(s) IV Push once  enoxaparin Injectable 40 milliGRAM(s) SubCutaneous daily  famotidine    Tablet 20 milliGRAM(s) Oral daily  glucagon  Injectable 1 milliGRAM(s) IntraMuscular once  influenza   Vaccine 0.5 milliLiter(s) IntraMuscular once  melatonin 3 milliGRAM(s) Oral at bedtime  oxyCODONE    IR 2.5 milliGRAM(s) Oral every 4 hours  tamsulosin 0.4 milliGRAM(s) Oral daily    MEDICATIONS  (PRN):  lidocaine   4% Patch 1 Patch Transdermal every 24 hours PRN back pain      CAPILLARY BLOOD GLUCOSE        I&O's Summary    11 Oct 2021 07:01  -  12 Oct 2021 07:00  --------------------------------------------------------  IN: 636 mL / OUT: 400 mL / NET: 236 mL        PHYSICAL EXAM:  Vital Signs Last 24 Hrs  T(C): 36.6 (12 Oct 2021 14:45), Max: 36.6 (11 Oct 2021 17:34)  T(F): 97.8 (12 Oct 2021 14:45), Max: 97.9 (11 Oct 2021 17:34)  HR: 86 (12 Oct 2021 14:45) (86 - 97)  BP: 107/59 (12 Oct 2021 14:45) (107/59 - 114/68)  BP(mean): --  RR: 18 (12 Oct 2021 14:45) (18 - 19)  SpO2: 96% (12 Oct 2021 14:45) (96% - 96%)  CONSTITUTIONAL: NAD, cachectic  RESPIRATORY: Normal respiratory effort; lungs are clear to auscultation bilaterally  CARDIOVASCULAR: Regular rate and rhythm, normal S1 and S2,trace b/l lower extremity edema  ABDOMEN: Nontender to palpation, normoactive bowel sounds  MUSCLOSKELETAL: no clubbing or cyanosis of digits  PSYCH: A+O to person, place, and time; affect appropriate    LABS:                        8.0    6.01  )-----------( 293      ( 12 Oct 2021 06:58 )             24.1                       RADIOLOGY & ADDITIONAL TESTS:  Results Reviewed:   Imaging Personally Reviewed:  Electrocardiogram Personally Reviewed:    COORDINATION OF CARE:  Care Discussed with Consultants/Other Providers [Y/N]: Dr. Kaylynn noel care, c/w pain control  Prior or Outpatient Records Reviewed [Y/N]:

## 2021-10-12 NOTE — PROGRESS NOTE ADULT - PROBLEM SELECTOR PLAN 1
R/o pancreatic cancer  - CT abdomen showing findings highly concerning for pancreatic cancer and pneumobilia and intrahepatic biliary ductal dilatation in the left and right hepatic lobes.  - CA 19-9 - 2722  - Hepatology consulted, patient now declining further workup as not in line with his GO  - Palliative care consulted and will plan for Home hospice  - Pain control

## 2021-10-13 LAB
ANION GAP SERPL CALC-SCNC: 9 MMOL/L — SIGNIFICANT CHANGE UP (ref 7–14)
BUN SERPL-MCNC: 31 MG/DL — HIGH (ref 7–23)
CALCIUM SERPL-MCNC: 7.6 MG/DL — LOW (ref 8.4–10.5)
CHLORIDE SERPL-SCNC: 105 MMOL/L — SIGNIFICANT CHANGE UP (ref 98–107)
CO2 SERPL-SCNC: 22 MMOL/L — SIGNIFICANT CHANGE UP (ref 22–31)
CREAT SERPL-MCNC: 1.01 MG/DL — SIGNIFICANT CHANGE UP (ref 0.5–1.3)
GLUCOSE SERPL-MCNC: 87 MG/DL — SIGNIFICANT CHANGE UP (ref 70–99)
HCT VFR BLD CALC: 26.2 % — LOW (ref 39–50)
HGB BLD-MCNC: 8.4 G/DL — LOW (ref 13–17)
MAGNESIUM SERPL-MCNC: 2 MG/DL — SIGNIFICANT CHANGE UP (ref 1.6–2.6)
MCHC RBC-ENTMCNC: 30.5 PG — SIGNIFICANT CHANGE UP (ref 27–34)
MCHC RBC-ENTMCNC: 32.1 GM/DL — SIGNIFICANT CHANGE UP (ref 32–36)
MCV RBC AUTO: 95.3 FL — SIGNIFICANT CHANGE UP (ref 80–100)
NRBC # BLD: 0 /100 WBCS — SIGNIFICANT CHANGE UP
NRBC # FLD: 0 K/UL — SIGNIFICANT CHANGE UP
PHOSPHATE SERPL-MCNC: 3.2 MG/DL — SIGNIFICANT CHANGE UP (ref 2.5–4.5)
PLATELET # BLD AUTO: 346 K/UL — SIGNIFICANT CHANGE UP (ref 150–400)
POTASSIUM SERPL-MCNC: 3.9 MMOL/L — SIGNIFICANT CHANGE UP (ref 3.5–5.3)
POTASSIUM SERPL-SCNC: 3.9 MMOL/L — SIGNIFICANT CHANGE UP (ref 3.5–5.3)
RBC # BLD: 2.75 M/UL — LOW (ref 4.2–5.8)
RBC # FLD: 18.8 % — HIGH (ref 10.3–14.5)
SARS-COV-2 RNA SPEC QL NAA+PROBE: SIGNIFICANT CHANGE UP
SODIUM SERPL-SCNC: 136 MMOL/L — SIGNIFICANT CHANGE UP (ref 135–145)
WBC # BLD: 5.55 K/UL — SIGNIFICANT CHANGE UP (ref 3.8–10.5)
WBC # FLD AUTO: 5.55 K/UL — SIGNIFICANT CHANGE UP (ref 3.8–10.5)

## 2021-10-13 PROCEDURE — 99232 SBSQ HOSP IP/OBS MODERATE 35: CPT

## 2021-10-13 RX ORDER — OXYCODONE HYDROCHLORIDE 5 MG/1
2.5 TABLET ORAL EVERY 4 HOURS
Refills: 0 | Status: DISCONTINUED | OUTPATIENT
Start: 2021-10-13 | End: 2021-10-13

## 2021-10-13 RX ORDER — OXYCODONE HYDROCHLORIDE 5 MG/1
2.5 TABLET ORAL EVERY 4 HOURS
Refills: 0 | Status: DISCONTINUED | OUTPATIENT
Start: 2021-10-13 | End: 2021-10-14

## 2021-10-13 RX ADMIN — OXYCODONE HYDROCHLORIDE 2.5 MILLIGRAM(S): 5 TABLET ORAL at 18:12

## 2021-10-13 RX ADMIN — OXYCODONE HYDROCHLORIDE 2.5 MILLIGRAM(S): 5 TABLET ORAL at 06:45

## 2021-10-13 RX ADMIN — OXYCODONE HYDROCHLORIDE 2.5 MILLIGRAM(S): 5 TABLET ORAL at 22:08

## 2021-10-13 RX ADMIN — OXYCODONE HYDROCHLORIDE 2.5 MILLIGRAM(S): 5 TABLET ORAL at 06:15

## 2021-10-13 RX ADMIN — Medication 650 MILLIGRAM(S): at 18:13

## 2021-10-13 RX ADMIN — Medication 650 MILLIGRAM(S): at 05:48

## 2021-10-13 RX ADMIN — ENOXAPARIN SODIUM 40 MILLIGRAM(S): 100 INJECTION SUBCUTANEOUS at 12:24

## 2021-10-13 RX ADMIN — OXYCODONE HYDROCHLORIDE 2.5 MILLIGRAM(S): 5 TABLET ORAL at 22:38

## 2021-10-13 RX ADMIN — OXYCODONE HYDROCHLORIDE 2.5 MILLIGRAM(S): 5 TABLET ORAL at 13:49

## 2021-10-13 RX ADMIN — Medication 650 MILLIGRAM(S): at 12:24

## 2021-10-13 RX ADMIN — OXYCODONE HYDROCHLORIDE 2.5 MILLIGRAM(S): 5 TABLET ORAL at 10:50

## 2021-10-13 RX ADMIN — Medication 3 MILLIGRAM(S): at 22:08

## 2021-10-13 RX ADMIN — Medication 1 APPLICATION(S): at 05:19

## 2021-10-13 RX ADMIN — Medication 650 MILLIGRAM(S): at 05:18

## 2021-10-13 RX ADMIN — FAMOTIDINE 20 MILLIGRAM(S): 10 INJECTION INTRAVENOUS at 12:24

## 2021-10-13 RX ADMIN — TAMSULOSIN HYDROCHLORIDE 0.4 MILLIGRAM(S): 0.4 CAPSULE ORAL at 12:23

## 2021-10-13 NOTE — PROGRESS NOTE ADULT - PROBLEM SELECTOR PLAN 6
Suspect hospital associated. Present on day 2 of hospitalization.  Delirium precautions-- lights on during day, blinds open during the day, frequent reorientation, television off at night
Delirium precautions
Delirium precautions-- lights on during day, blinds open during the day, frequent reorientation, television off at night
Delirium precautions-- lights on during day, blinds open during the day, frequent reorientation, television off at night
Suspect hospital associated. Present on day 2 of hospitalization.  Delirium precautions-- lights on during day, blinds open during the day, frequent reorientation, television off at night
Delirium precautions-- lights on during day, blinds open during the day, frequent reorientation, television off at night
Delirium precautions-- lights on during day, blinds open during the day, frequent reorientation, television off at night

## 2021-10-13 NOTE — PROGRESS NOTE ADULT - PROBLEM SELECTOR PROBLEM 5
Adult failure to thrive
Chronic anemia
Adult failure to thrive
Chronic anemia
Adult failure to thrive

## 2021-10-13 NOTE — PROGRESS NOTE ADULT - PROBLEM SELECTOR PROBLEM 1
Pancreatic cancer
Alkaline phosphatase elevation
Alkaline phosphatase elevation
Pancreatic cancer

## 2021-10-13 NOTE — PROGRESS NOTE ADULT - PROBLEM SELECTOR PLAN 2
- labs consistent with AOCD in setting of likely malignancy   - Additionally with Bloody BM 10/10, Hgb stable.  Monitor.  No further episodes.  ASA DC'd

## 2021-10-13 NOTE — PROGRESS NOTE ADULT - ASSESSMENT
89yoM  w PMH of LBP s/p L hip arthroplasty (ab 13 years ago), unspecified gallbadder disease s/[facility], CAD, HTN, BPH who is p/w failure to thrive at home, decr PO intake, and worsening lower back pain, found to have likely metastatic pancreatic cancer

## 2021-10-13 NOTE — PROGRESS NOTE ADULT - PROBLEM SELECTOR PROBLEM 2
Chronic anemia
Spondylolisthesis, lumbar region
Spondylolisthesis, lumbar region
Chronic anemia
Portal vein thrombosis
Portal vein thrombosis
Chronic anemia

## 2021-10-13 NOTE — PROGRESS NOTE ADULT - NUTRITIONAL ASSESSMENT
This patient has been assessed with a concern for Malnutrition and has been determined to have a diagnosis/diagnoses of Severe protein-calorie malnutrition.    This patient is being managed with:   Diet Pureed-  Supplement Feeding Modality:  Oral  Ensure Enlive Cans or Servings Per Day:  1       Frequency:  Three Times a day  Entered: Oct 10 2021  6:55PM    
This patient has been assessed with a concern for Malnutrition and has been determined to have a diagnosis/diagnoses of Severe protein-calorie malnutrition.    This patient is being managed with:   Diet Regular-  Supplement Feeding Modality:  Oral  Ensure Enlive Cans or Servings Per Day:  1       Frequency:  Two Times a day  Entered: Oct  8 2021  9:25AM    
This patient has been assessed with a concern for Malnutrition and has been determined to have a diagnosis/diagnoses of Severe protein-calorie malnutrition.    This patient is being managed with:   Diet Regular-  Supplement Feeding Modality:  Oral  Ensure Enlive Cans or Servings Per Day:  1       Frequency:  Three Times a day  Entered: Oct  9 2021  5:43PM    
This patient has been assessed with a concern for Malnutrition and has been determined to have a diagnosis/diagnoses of Severe protein-calorie malnutrition.    This patient is being managed with:   Diet Pureed-  Supplement Feeding Modality:  Oral  Ensure Enlive Cans or Servings Per Day:  1       Frequency:  Three Times a day  Entered: Oct 10 2021  6:55PM    
This patient has been assessed with a concern for Malnutrition and has been determined to have a diagnosis/diagnoses of Severe protein-calorie malnutrition.    This patient is being managed with:   Diet Pureed-  Supplement Feeding Modality:  Oral  Ensure Enlive Cans or Servings Per Day:  1       Frequency:  Three Times a day  Entered: Oct 10 2021  6:55PM

## 2021-10-13 NOTE — PROGRESS NOTE ADULT - SUBJECTIVE AND OBJECTIVE BOX
Patient is a 89y old  Male who presents with a chief complaint of lower back pain, worsening (12 Oct 2021 15:07)      SUBJECTIVE / OVERNIGHT EVENTS: pt states he did not like the cream on his lower back  ADDITIONAL REVIEW OF SYSTEMS:    MEDICATIONS  (STANDING):  acetaminophen   Tablet .. 650 milliGRAM(s) Oral every 6 hours  dextrose 40% Gel 15 Gram(s) Oral once  dextrose 50% Injectable 25 Gram(s) IV Push once  dextrose 50% Injectable 25 Gram(s) IV Push once  dextrose 50% Injectable 12.5 Gram(s) IV Push once  enoxaparin Injectable 40 milliGRAM(s) SubCutaneous daily  famotidine    Tablet 20 milliGRAM(s) Oral daily  glucagon  Injectable 1 milliGRAM(s) IntraMuscular once  influenza   Vaccine 0.5 milliLiter(s) IntraMuscular once  melatonin 3 milliGRAM(s) Oral at bedtime  oxyCODONE    IR 2.5 milliGRAM(s) Oral every 4 hours  tamsulosin 0.4 milliGRAM(s) Oral daily    MEDICATIONS  (PRN):  lidocaine   4% Patch 1 Patch Transdermal every 24 hours PRN back pain      CAPILLARY BLOOD GLUCOSE      POCT Blood Glucose.: 88 mg/dL (13 Oct 2021 12:08)    I&O's Summary    12 Oct 2021 07:01  -  13 Oct 2021 07:00  --------------------------------------------------------  IN: 0 mL / OUT: 300 mL / NET: -300 mL        PHYSICAL EXAM:  Vital Signs Last 24 Hrs  T(C): 36.6 (13 Oct 2021 10:45), Max: 36.6 (12 Oct 2021 20:59)  T(F): 97.9 (13 Oct 2021 10:45), Max: 97.9 (13 Oct 2021 05:24)  HR: 97 (13 Oct 2021 10:45) (91 - 97)  BP: 106/71 (13 Oct 2021 10:45) (96/69 - 106/71)  BP(mean): --  RR: 17 (13 Oct 2021 10:45) (17 - 18)  SpO2: 99% (13 Oct 2021 10:45) (98% - 99%)  CONSTITUTIONAL: NAD, cachectic  RESPIRATORY: Normal respiratory effort; lungs are clear to auscultation bilaterally  CARDIOVASCULAR: Regular rate and rhythm, normal S1 and S2, No lower extremity edema  ABDOMEN: Nontender to palpation, normoactive bowel sounds  MUSCLOSKELETAL: no clubbing or cyanosis of digits  PSYCH: Awake, alert    LABS:                        8.4    5.55  )-----------( 346      ( 13 Oct 2021 11:45 )             26.2     10-13    136  |  105  |  31<H>  ----------------------------<  87  3.9   |  22  |  1.01    Ca    7.6<L>      13 Oct 2021 11:45  Phos  3.2     10-13  Mg     2.00     10-13                  RADIOLOGY & ADDITIONAL TESTS:  Results Reviewed:   Imaging Personally Reviewed:  Electrocardiogram Personally Reviewed:    COORDINATION OF CARE:  Care Discussed with Consultants/Other Providers [Y/N]:  Prior or Outpatient Records Reviewed [Y/N]:

## 2021-10-13 NOTE — PROGRESS NOTE ADULT - PROBLEM SELECTOR PLAN 1
R/o pancreatic cancer  - CT abdomen showing findings highly concerning for pancreatic cancer and pneumobilia and intrahepatic biliary ductal dilatation in the left and right hepatic lobes.  - CA 19-9 - 4322  - Hepatology consulted, patient now declining further workup as not in line with his GO  - Palliative care consulted, plan for MOHSEN followed by potential transfer to home hospice  - Pain control

## 2021-10-13 NOTE — PROGRESS NOTE ADULT - PROBLEM SELECTOR PLAN 5
- likely due to suspected pancreatic cancer   - Regular diet with Ensure BID supplementation  - GOC discussed, see separate note. Pt DNR/DNI.

## 2021-10-13 NOTE — PROGRESS NOTE ADULT - PROBLEM SELECTOR PROBLEM 3
Adult failure to thrive
Adult failure to thrive
Spondylolisthesis, lumbar region
Spondylolisthesis, lumbar region
Portal vein thrombosis

## 2021-10-13 NOTE — PROGRESS NOTE ADULT - REASON FOR ADMISSION
lower back pain, worsening

## 2021-10-13 NOTE — PROGRESS NOTE ADULT - PROBLEM SELECTOR PLAN 7
- Cont ASA 81 daily
- ASA on hold given concern for GIB

## 2021-10-14 VITALS
OXYGEN SATURATION: 100 % | TEMPERATURE: 97 F | SYSTOLIC BLOOD PRESSURE: 101 MMHG | RESPIRATION RATE: 17 BRPM | HEART RATE: 99 BPM | DIASTOLIC BLOOD PRESSURE: 65 MMHG

## 2021-10-14 LAB
ANION GAP SERPL CALC-SCNC: 12 MMOL/L — SIGNIFICANT CHANGE UP (ref 7–14)
BUN SERPL-MCNC: 30 MG/DL — HIGH (ref 7–23)
CALCIUM SERPL-MCNC: 7.5 MG/DL — LOW (ref 8.4–10.5)
CHLORIDE SERPL-SCNC: 105 MMOL/L — SIGNIFICANT CHANGE UP (ref 98–107)
CO2 SERPL-SCNC: 19 MMOL/L — LOW (ref 22–31)
CREAT SERPL-MCNC: 0.97 MG/DL — SIGNIFICANT CHANGE UP (ref 0.5–1.3)
GLUCOSE SERPL-MCNC: 68 MG/DL — LOW (ref 70–99)
HCT VFR BLD CALC: 24.4 % — LOW (ref 39–50)
HGB BLD-MCNC: 8 G/DL — LOW (ref 13–17)
MAGNESIUM SERPL-MCNC: 1.9 MG/DL — SIGNIFICANT CHANGE UP (ref 1.6–2.6)
MCHC RBC-ENTMCNC: 31.4 PG — SIGNIFICANT CHANGE UP (ref 27–34)
MCHC RBC-ENTMCNC: 32.8 GM/DL — SIGNIFICANT CHANGE UP (ref 32–36)
MCV RBC AUTO: 95.7 FL — SIGNIFICANT CHANGE UP (ref 80–100)
NRBC # BLD: 0 /100 WBCS — SIGNIFICANT CHANGE UP
NRBC # FLD: 0 K/UL — SIGNIFICANT CHANGE UP
PHOSPHATE SERPL-MCNC: 3 MG/DL — SIGNIFICANT CHANGE UP (ref 2.5–4.5)
PLATELET # BLD AUTO: 302 K/UL — SIGNIFICANT CHANGE UP (ref 150–400)
POTASSIUM SERPL-MCNC: 4 MMOL/L — SIGNIFICANT CHANGE UP (ref 3.5–5.3)
POTASSIUM SERPL-SCNC: 4 MMOL/L — SIGNIFICANT CHANGE UP (ref 3.5–5.3)
RBC # BLD: 2.55 M/UL — LOW (ref 4.2–5.8)
RBC # FLD: 18.9 % — HIGH (ref 10.3–14.5)
SODIUM SERPL-SCNC: 136 MMOL/L — SIGNIFICANT CHANGE UP (ref 135–145)
WBC # BLD: 4.85 K/UL — SIGNIFICANT CHANGE UP (ref 3.8–10.5)
WBC # FLD AUTO: 4.85 K/UL — SIGNIFICANT CHANGE UP (ref 3.8–10.5)

## 2021-10-14 PROCEDURE — 99239 HOSP IP/OBS DSCHRG MGMT >30: CPT

## 2021-10-14 RX ORDER — GABAPENTIN 400 MG/1
0 CAPSULE ORAL
Qty: 0 | Refills: 0 | DISCHARGE

## 2021-10-14 RX ORDER — OXYCODONE HYDROCHLORIDE 5 MG/1
0.5 TABLET ORAL
Qty: 0 | Refills: 0 | DISCHARGE

## 2021-10-14 RX ORDER — CAPSAICIN 0.025 %
1 CREAM (GRAM) TOPICAL
Qty: 1 | Refills: 0
Start: 2021-10-14

## 2021-10-14 RX ORDER — ASPIRIN/CALCIUM CARB/MAGNESIUM 324 MG
0 TABLET ORAL
Qty: 0 | Refills: 0 | DISCHARGE

## 2021-10-14 RX ORDER — LIDOCAINE 4 G/100G
1 CREAM TOPICAL
Qty: 30 | Refills: 0
Start: 2021-10-14

## 2021-10-14 RX ORDER — LIDOCAINE 4 G/100G
1 CREAM TOPICAL
Qty: 0 | Refills: 0 | DISCHARGE
Start: 2021-10-14

## 2021-10-14 RX ORDER — OXYCODONE HYDROCHLORIDE 5 MG/1
2.5 TABLET ORAL
Qty: 0 | Refills: 0 | DISCHARGE
Start: 2021-10-14

## 2021-10-14 RX ADMIN — FAMOTIDINE 20 MILLIGRAM(S): 10 INJECTION INTRAVENOUS at 11:57

## 2021-10-14 RX ADMIN — Medication 650 MILLIGRAM(S): at 06:10

## 2021-10-14 RX ADMIN — Medication 650 MILLIGRAM(S): at 06:40

## 2021-10-14 RX ADMIN — Medication 650 MILLIGRAM(S): at 11:58

## 2021-10-14 RX ADMIN — OXYCODONE HYDROCHLORIDE 2.5 MILLIGRAM(S): 5 TABLET ORAL at 09:56

## 2021-10-14 RX ADMIN — Medication 650 MILLIGRAM(S): at 00:40

## 2021-10-14 RX ADMIN — OXYCODONE HYDROCHLORIDE 2.5 MILLIGRAM(S): 5 TABLET ORAL at 17:00

## 2021-10-14 RX ADMIN — TAMSULOSIN HYDROCHLORIDE 0.4 MILLIGRAM(S): 0.4 CAPSULE ORAL at 11:57

## 2021-10-14 RX ADMIN — ENOXAPARIN SODIUM 40 MILLIGRAM(S): 100 INJECTION SUBCUTANEOUS at 11:57

## 2021-10-14 RX ADMIN — OXYCODONE HYDROCHLORIDE 2.5 MILLIGRAM(S): 5 TABLET ORAL at 16:47

## 2021-10-14 RX ADMIN — Medication 650 MILLIGRAM(S): at 00:10

## 2021-10-14 NOTE — CHART NOTE - NSCHARTNOTEFT_GEN_A_CORE
Change in physical exam this afternoon compared to this morning. Pt does not arouse to sternal rub, not responding to any commands. Has not eaten any food all day. STAT vitals checked. STAT labs ordered: poc gluc, CBC, CMP, trop, ammonia. CT abd/pelv cancelled by GI ([facility] Pt's daughter and HCPOA (Anna) contacted and is able to come to the hospital today. Palliative care also consulted today.
Medical records sent over from the [facility] notable for the followin2021  INR: 1.27 PT 15.1 PTT 26.6  T bili 0.9, direct bili 0.3  Alk phos 606  AST/ALT wnl  Hgb 10.0    9/3/2021  Alb 3.2  T bili 1.1  Alk phos 641   Hgb 10.3    2021  FIT positive for blood  TSH 2.03  Hgb 9.3  Alk phos 529  TIBC 269  iron 25    [facility] Care Records:   21  GGT: 222  Alk phos 459  Hgb 11.4
Pt pain controlled with current regimen.  No further w/u of cancer.  Pt is dnr/dni.  goal is josemanuel.  Will sign off.  Please reconsult if goals or symptoms change. Stephy Chaidez DO
Pt referred to hospice, plan would be d/c with home hospice services  Symptoms: already on atc Tylenol, pain improved from yesterday, has Oxycodone 2.5mg PO q4hrs PRN for moderate pain  Would continue  Page for uncontrolled symptoms [id number]
Pt seen/examined at bedside.  stable for discharge to [facility].  pt reports good pain control.  Plan for discharge to [facility] this afternoon.  Plan discussed at length with daughter Anna.  dc time 40 minutes
As per RN pt with episode of dark red BM. Moderate amount of dark red stool seen on shakira, will follow repeat CBC. Pt also c/o severe lower back pain for which Oxycodone administered. Will monitor improvement in pain and for further bleeding episodes. Will hold Aspirin for now and trend H/H. T
Phone call to Anna to discuss CT results. MARICRUZ to call back #[phone] to discuss. Discussed with daughter yesterday that metastatic cancer was most likely based on the US results, so this information is relatively expected.
Spoke with Hepatology, recommend triple-phase CT A/P     [facility]  [id number]

## 2021-10-14 NOTE — CHART NOTE - NSCHARTNOTESELECT_GEN_ALL_CORE
ACP/Event Note
Event Note
Off Service Note
Palliative Care/Event Note

## 2021-10-17 ENCOUNTER — INPATIENT (INPATIENT)
Facility: HOSPITAL | Age: 86
LOS: 4 days | Discharge: NURSING, REHAB OR LONG-TERM CARE | End: 2021-10-22
Attending: STUDENT IN AN ORGANIZED HEALTH CARE EDUCATION/TRAINING PROGRAM | Admitting: STUDENT IN AN ORGANIZED HEALTH CARE EDUCATION/TRAINING PROGRAM
Payer: MEDICARE

## 2021-10-17 VITALS
TEMPERATURE: 98 F | RESPIRATION RATE: 20 BRPM | HEART RATE: 102 BPM | SYSTOLIC BLOOD PRESSURE: 126 MMHG | HEIGHT: 67 IN | DIASTOLIC BLOOD PRESSURE: 81 MMHG | WEIGHT: 127.87 LBS | OXYGEN SATURATION: 99 %

## 2021-10-17 DIAGNOSIS — Z98.890 OTHER SPECIFIED POSTPROCEDURAL STATES: Chronic | ICD-10-CM

## 2021-10-17 DIAGNOSIS — I26.99 OTHER PULMONARY EMBOLISM WITHOUT ACUTE COR PULMONALE: ICD-10-CM

## 2021-10-17 DIAGNOSIS — I10 ESSENTIAL (PRIMARY) HYPERTENSION: ICD-10-CM

## 2021-10-17 DIAGNOSIS — N40.0 BENIGN PROSTATIC HYPERPLASIA WITHOUT LOWER URINARY TRACT SYMPTOMS: ICD-10-CM

## 2021-10-17 DIAGNOSIS — C25.9 MALIGNANT NEOPLASM OF PANCREAS, UNSPECIFIED: ICD-10-CM

## 2021-10-17 PROBLEM — K82.9 DISEASE OF GALLBLADDER, UNSPECIFIED: Chronic | Status: ACTIVE | Noted: 2021-10-06

## 2021-10-17 PROBLEM — I25.10 ATHEROSCLEROTIC HEART DISEASE OF NATIVE CORONARY ARTERY WITHOUT ANGINA PECTORIS: Chronic | Status: ACTIVE | Noted: 2021-10-06

## 2021-10-17 LAB
ALBUMIN SERPL ELPH-MCNC: 1.4 G/DL — LOW (ref 3.3–5)
ALP SERPL-CCNC: 1670 U/L — HIGH (ref 40–120)
ALT FLD-CCNC: 24 U/L — SIGNIFICANT CHANGE UP (ref 12–78)
ANION GAP SERPL CALC-SCNC: 7 MMOL/L — SIGNIFICANT CHANGE UP (ref 5–17)
AST SERPL-CCNC: 31 U/L — SIGNIFICANT CHANGE UP (ref 15–37)
BASOPHILS # BLD AUTO: 0.02 K/UL — SIGNIFICANT CHANGE UP (ref 0–0.2)
BASOPHILS NFR BLD AUTO: 0.4 % — SIGNIFICANT CHANGE UP (ref 0–2)
BILIRUB SERPL-MCNC: 1.3 MG/DL — HIGH (ref 0.2–1.2)
BUN SERPL-MCNC: 25 MG/DL — HIGH (ref 7–23)
CALCIUM SERPL-MCNC: 7.4 MG/DL — LOW (ref 8.5–10.1)
CHLORIDE SERPL-SCNC: 109 MMOL/L — HIGH (ref 96–108)
CK SERPL-CCNC: 54 U/L — SIGNIFICANT CHANGE UP (ref 26–308)
CO2 SERPL-SCNC: 23 MMOL/L — SIGNIFICANT CHANGE UP (ref 22–31)
CREAT SERPL-MCNC: 0.83 MG/DL — SIGNIFICANT CHANGE UP (ref 0.5–1.3)
EOSINOPHIL # BLD AUTO: 0.05 K/UL — SIGNIFICANT CHANGE UP (ref 0–0.5)
EOSINOPHIL NFR BLD AUTO: 0.9 % — SIGNIFICANT CHANGE UP (ref 0–6)
FLUAV AG NPH QL: SIGNIFICANT CHANGE UP
FLUBV AG NPH QL: SIGNIFICANT CHANGE UP
GLUCOSE SERPL-MCNC: 119 MG/DL — HIGH (ref 70–99)
HCT VFR BLD CALC: 31.9 % — LOW (ref 39–50)
HGB BLD-MCNC: 10.8 G/DL — LOW (ref 13–17)
IMM GRANULOCYTES NFR BLD AUTO: 0.4 % — SIGNIFICANT CHANGE UP (ref 0–1.5)
LYMPHOCYTES # BLD AUTO: 0.72 K/UL — LOW (ref 1–3.3)
LYMPHOCYTES # BLD AUTO: 13.3 % — SIGNIFICANT CHANGE UP (ref 13–44)
MCHC RBC-ENTMCNC: 31.5 PG — SIGNIFICANT CHANGE UP (ref 27–34)
MCHC RBC-ENTMCNC: 33.9 GM/DL — SIGNIFICANT CHANGE UP (ref 32–36)
MCV RBC AUTO: 93 FL — SIGNIFICANT CHANGE UP (ref 80–100)
MONOCYTES # BLD AUTO: 0.68 K/UL — SIGNIFICANT CHANGE UP (ref 0–0.9)
MONOCYTES NFR BLD AUTO: 12.5 % — SIGNIFICANT CHANGE UP (ref 2–14)
NEUTROPHILS # BLD AUTO: 3.93 K/UL — SIGNIFICANT CHANGE UP (ref 1.8–7.4)
NEUTROPHILS NFR BLD AUTO: 72.5 % — SIGNIFICANT CHANGE UP (ref 43–77)
NRBC # BLD: 0 /100 WBCS — SIGNIFICANT CHANGE UP (ref 0–0)
PLATELET # BLD AUTO: 419 K/UL — HIGH (ref 150–400)
POTASSIUM SERPL-MCNC: 4 MMOL/L — SIGNIFICANT CHANGE UP (ref 3.5–5.3)
POTASSIUM SERPL-SCNC: 4 MMOL/L — SIGNIFICANT CHANGE UP (ref 3.5–5.3)
PROT SERPL-MCNC: 5.4 GM/DL — LOW (ref 6–8.3)
RBC # BLD: 3.43 M/UL — LOW (ref 4.2–5.8)
RBC # FLD: 19.5 % — HIGH (ref 10.3–14.5)
SARS-COV-2 RNA SPEC QL NAA+PROBE: SIGNIFICANT CHANGE UP
SODIUM SERPL-SCNC: 139 MMOL/L — SIGNIFICANT CHANGE UP (ref 135–145)
TROPONIN I SERPL-MCNC: 0.03 NG/ML — SIGNIFICANT CHANGE UP (ref 0.01–0.04)
WBC # BLD: 5.42 K/UL — SIGNIFICANT CHANGE UP (ref 3.8–10.5)
WBC # FLD AUTO: 5.42 K/UL — SIGNIFICANT CHANGE UP (ref 3.8–10.5)

## 2021-10-17 PROCEDURE — 12345: CPT | Mod: NC

## 2021-10-17 PROCEDURE — 71045 X-RAY EXAM CHEST 1 VIEW: CPT | Mod: 26

## 2021-10-17 PROCEDURE — 71275 CT ANGIOGRAPHY CHEST: CPT | Mod: 26,MA

## 2021-10-17 PROCEDURE — 93010 ELECTROCARDIOGRAM REPORT: CPT

## 2021-10-17 PROCEDURE — 99291 CRITICAL CARE FIRST HOUR: CPT

## 2021-10-17 PROCEDURE — 99222 1ST HOSP IP/OBS MODERATE 55: CPT

## 2021-10-17 RX ORDER — TAMSULOSIN HYDROCHLORIDE 0.4 MG/1
0.4 CAPSULE ORAL AT BEDTIME
Refills: 0 | Status: DISCONTINUED | OUTPATIENT
Start: 2021-10-17 | End: 2021-10-22

## 2021-10-17 RX ORDER — FAMOTIDINE 10 MG/ML
20 INJECTION INTRAVENOUS DAILY
Refills: 0 | Status: DISCONTINUED | OUTPATIENT
Start: 2021-10-17 | End: 2021-10-22

## 2021-10-17 RX ORDER — RIVAROXABAN 15 MG-20MG
15 KIT ORAL
Refills: 0 | Status: DISCONTINUED | OUTPATIENT
Start: 2021-10-17 | End: 2021-10-22

## 2021-10-17 RX ORDER — OXYCODONE HYDROCHLORIDE 5 MG/1
2.5 TABLET ORAL EVERY 4 HOURS
Refills: 0 | Status: DISCONTINUED | OUTPATIENT
Start: 2021-10-17 | End: 2021-10-21

## 2021-10-17 RX ORDER — ENOXAPARIN SODIUM 100 MG/ML
60 INJECTION SUBCUTANEOUS ONCE
Refills: 0 | Status: COMPLETED | OUTPATIENT
Start: 2021-10-17 | End: 2021-10-17

## 2021-10-17 RX ORDER — ENOXAPARIN SODIUM 100 MG/ML
80 INJECTION SUBCUTANEOUS ONCE
Refills: 0 | Status: DISCONTINUED | OUTPATIENT
Start: 2021-10-17 | End: 2021-10-17

## 2021-10-17 RX ORDER — SENNA PLUS 8.6 MG/1
1 TABLET ORAL
Refills: 0 | Status: DISCONTINUED | OUTPATIENT
Start: 2021-10-17 | End: 2021-10-19

## 2021-10-17 RX ORDER — AMLODIPINE BESYLATE 2.5 MG/1
2.5 TABLET ORAL DAILY
Refills: 0 | Status: DISCONTINUED | OUTPATIENT
Start: 2021-10-17 | End: 2021-10-22

## 2021-10-17 RX ADMIN — RIVAROXABAN 15 MILLIGRAM(S): KIT at 23:06

## 2021-10-17 RX ADMIN — ENOXAPARIN SODIUM 60 MILLIGRAM(S): 100 INJECTION SUBCUTANEOUS at 06:38

## 2021-10-17 RX ADMIN — FAMOTIDINE 20 MILLIGRAM(S): 10 INJECTION INTRAVENOUS at 12:45

## 2021-10-17 RX ADMIN — TAMSULOSIN HYDROCHLORIDE 0.4 MILLIGRAM(S): 0.4 CAPSULE ORAL at 21:34

## 2021-10-17 NOTE — H&P ADULT - NSHPLABSRESULTS_GEN_ALL_CORE
Recent Vitals  T(C): 36.4 (10-17-21 @ 04:04), Max: 36.4 (10-17-21 @ 04:04)  HR: 102 (10-17-21 @ 04:04) (102 - 102)  BP: 126/81 (10-17-21 @ 04:04) (126/81 - 126/81)  RR: 20 (10-17-21 @ 04:04) (20 - 20)  SpO2: 99% (10-17-21 @ 04:04) (99% - 99%)                        10.8   5.42  )-----------( 419      ( 17 Oct 2021 04:43 )             31.9     10-17    139  |  109<H>  |  25<H>  ----------------------------<  119<H>  4.0   |  23  |  0.83    Ca    7.4<L>      17 Oct 2021 04:43    TPro  5.4<L>  /  Alb  1.4<L>  /  TBili  1.3<H>  /  DBili  x   /  AST  31  /  ALT  24  /  AlkPhos  1670<H>  10-17      LIVER FUNCTIONS - ( 17 Oct 2021 04:43 )  Alb: 1.4 g/dL / Pro: 5.4 gm/dL / ALK PHOS: 1670 U/L / ALT: 24 U/L / AST: 31 U/L / GGT: x               Home Medications:  acetaminophen 325 mg oral tablet: 2 tab(s) orally every 6 hours (10 Oct 2021 07:07)  amLODIPine 2.5 mg oral tablet: 1 tab(s) orally once a day (06 Oct 2021 10:58)  famotidine 20 mg oral tablet, disintegrating: orally once a day (06 Oct 2021 10:58)  lidocaine 4% topical film: Apply topically to affected area every 24 hours (14 Oct 2021 12:53)  Metamucil 3.4 g/3.7 g oral powder for reconstitution: orally once a day (06 Oct 2021 10:58)  oxyCODONE 5 mg oral tablet: 0.5 tab(s) orally every 4 hours (14 Oct 2021 12:55)  senna oral tablet: 1 tab(s) orally 2 times a day (10 Oct 2021 07:07)  tamsulosin 0.4 mg oral capsule: 1 cap(s) orally once a day (06 Oct 2021 10:58)

## 2021-10-17 NOTE — H&P ADULT - HISTORY OF PRESENT ILLNESS
Patient is a 89M with a PMH of CAD, HTN, BPH, ?gallbladder disease s/p biliary stent in 2018, metastatic prostate cancer who was sent to the ED from [facility] secondary to dyspnea.  History limited as patient is severely hard of hearing.  Patient was to receive 2 units of PRBCs this am however reported chest pain and dyspnea after the first unit.  Was given lasix at [facility] and was transferred to the ED.  Patient currently has no acute complaints, expresses frustration as he has been awake all night.  Reportedly hypoxic on room air, current SpO2 97% on 3L via NC.  Labs benign.  CT angio chest reveals R sided pulmonary emboli.  Will admit to tele.

## 2021-10-17 NOTE — ED ADULT NURSE NOTE - NSIMPLEMENTINTERV_GEN_ALL_ED
Implemented All Fall Risk Interventions:  [place] to call system. Call bell, personal items and telephone within reach. Instruct patient to call for assistance. Room bathroom lighting operational. Non-slip footwear when patient is off stretcher. Physically safe environment: no spills, clutter or unnecessary equipment. Stretcher in lowest position, wheels locked, appropriate side rails in place. Provide visual cue, wrist band, yellow gown, etc. Monitor gait and stability. Monitor for mental status changes and reorient to person, place, and time. Review medications for side effects contributing to fall risk. Reinforce activity limits and safety measures with patient and family.

## 2021-10-17 NOTE — H&P ADULT - PROBLEM SELECTOR PLAN 1
Patient started on therapeutic lovenox in ED.    Will further discuss risks/benefits with daughter as patient known to have streaks of blood in his stool and was receiving PRBCs earlier today.  Attempts to reach daughter were unanswered.  Please discuss treatment options and GOC with HCP

## 2021-10-17 NOTE — ED PROVIDER NOTE - OBJECTIVE STATEMENT
This patient is a 89 year old man with hx of metastatic pancreatic cancer sent from [facility] for respiratory distress.  Patient was receiving a blood transfusion and became acutely SOB during the transfusion of the first unit.  Patient became hypoxic requiring oxygen.  As per [facility] attending patient c/o chest pain and SOB.  He was given lasix and sent to the ER.  Currently in the ER patient has no complaints.

## 2021-10-17 NOTE — ED ADULT NURSE NOTE - OBJECTIVE STATEMENT
[facility]. Pt is A&Ox4, s/p blood transfusion. Endorses not breathing well afterwards. Pt denies any current difficulties breathing, sob, pain. Pt placed on cardiac monitor at bedside, EKG done.

## 2021-10-17 NOTE — H&P ADULT - PROBLEM SELECTOR PLAN 2
continue cholestyramine and oxycodone prn  GOC discussion with HCP as patient reportedly now full code

## 2021-10-17 NOTE — ED ADULT NURSE NOTE - ED STAT RN HANDOFF DETAILS
Report endorsed to oncoming shelby Corbin RN. Safety checks completed this shift. Safety rounds completed hourly.  IV sites checked Q2+remains WDL. Medications administered as ordered with no signs/symptoms of adverse reactions. Fall & skin precautions in place. Any issues endorsed to oncoming RN for follow up.

## 2021-10-17 NOTE — PROGRESS NOTE ADULT - SUBJECTIVE AND OBJECTIVE BOX
89 yo M with a history of CAD, HTN, HLD, BPH, gallbladder disease s/p biliary stent in 2018, metastatic prostate cancer who was sent to the ED from [facility] secondary to dyspnea after getting 2 units of PRBCs and was found to have bilateral pulmonary emboli on CTA. Pt is lying in bed in NAD and stated "leave me alone" when I tried to examine him or talk w/ him. I called and spoke w/ his daughter and offered coumadin vs DOAC. She decided on DAOC and he has been started on Xarelto.

## 2021-10-17 NOTE — ED PROVIDER NOTE - CLINICAL SUMMARY MEDICAL DECISION MAKING FREE TEXT BOX
Respiratory distress during blood transfusion patient well appearing in no distress at this time.  Will check EKG, CXR, Labs, consider CTA r/o PE as patient is high risk.

## 2021-10-17 NOTE — H&P ADULT - ASSESSMENT
Patient is a 89M with a PMH of CAD, HTN, BPH, ?gallbladder disease s/p biliary stent in 2018, metastatic prostate cancer who was sent to the ED from [facility] secondary to dyspnea.  Patient is hard of hearing but communicates well.  Patient was to received 2 units of PRBCs this am however reported chest pain and dyspnea after the first unit.  Was given lasix at [facility] and was transferred to the ED.  Patient currently has no acute complaints, expresses frustation as he has been awake all night.  Reportedly hypoxic on room air, current SpO2 97% on 3L via NC.  Labs benign.  CT angio chest reveals R sided pulmonary emboli.  Will admit to tele.

## 2021-10-18 DIAGNOSIS — R63.0 ANOREXIA: ICD-10-CM

## 2021-10-18 DIAGNOSIS — Z51.5 ENCOUNTER FOR PALLIATIVE CARE: ICD-10-CM

## 2021-10-18 DIAGNOSIS — R06.02 SHORTNESS OF BREATH: ICD-10-CM

## 2021-10-18 DIAGNOSIS — R62.7 ADULT FAILURE TO THRIVE: ICD-10-CM

## 2021-10-18 DIAGNOSIS — G89.3 NEOPLASM RELATED PAIN (ACUTE) (CHRONIC): ICD-10-CM

## 2021-10-18 LAB
ALBUMIN SERPL ELPH-MCNC: 1.4 G/DL — LOW (ref 3.3–5)
ALP SERPL-CCNC: 1325 U/L — HIGH (ref 40–120)
ALT FLD-CCNC: 19 U/L — SIGNIFICANT CHANGE UP (ref 12–78)
ANION GAP SERPL CALC-SCNC: 5 MMOL/L — SIGNIFICANT CHANGE UP (ref 5–17)
APTT BLD: 36 SEC — HIGH (ref 27.5–35.5)
AST SERPL-CCNC: 18 U/L — SIGNIFICANT CHANGE UP (ref 15–37)
BILIRUB SERPL-MCNC: 1 MG/DL — SIGNIFICANT CHANGE UP (ref 0.2–1.2)
BUN SERPL-MCNC: 29 MG/DL — HIGH (ref 7–23)
CALCIUM SERPL-MCNC: 8 MG/DL — LOW (ref 8.5–10.1)
CHLORIDE SERPL-SCNC: 110 MMOL/L — HIGH (ref 96–108)
CO2 SERPL-SCNC: 24 MMOL/L — SIGNIFICANT CHANGE UP (ref 22–31)
COVID-19 SPIKE DOMAIN AB INTERP: POSITIVE
COVID-19 SPIKE DOMAIN ANTIBODY RESULT: >250 U/ML — HIGH
CREAT SERPL-MCNC: 0.88 MG/DL — SIGNIFICANT CHANGE UP (ref 0.5–1.3)
GLUCOSE SERPL-MCNC: 101 MG/DL — HIGH (ref 70–99)
HCT VFR BLD CALC: 29 % — LOW (ref 39–50)
HGB BLD-MCNC: 9.7 G/DL — LOW (ref 13–17)
INR BLD: 2.27 RATIO — HIGH (ref 0.88–1.16)
MAGNESIUM SERPL-MCNC: 2.2 MG/DL — SIGNIFICANT CHANGE UP (ref 1.6–2.6)
MCHC RBC-ENTMCNC: 30.9 PG — SIGNIFICANT CHANGE UP (ref 27–34)
MCHC RBC-ENTMCNC: 33.4 GM/DL — SIGNIFICANT CHANGE UP (ref 32–36)
MCV RBC AUTO: 92.4 FL — SIGNIFICANT CHANGE UP (ref 80–100)
NRBC # BLD: 0 /100 WBCS — SIGNIFICANT CHANGE UP (ref 0–0)
PHOSPHATE SERPL-MCNC: 2.9 MG/DL — SIGNIFICANT CHANGE UP (ref 2.5–4.5)
PLATELET # BLD AUTO: 416 K/UL — HIGH (ref 150–400)
POTASSIUM SERPL-MCNC: 4 MMOL/L — SIGNIFICANT CHANGE UP (ref 3.5–5.3)
POTASSIUM SERPL-SCNC: 4 MMOL/L — SIGNIFICANT CHANGE UP (ref 3.5–5.3)
PROT SERPL-MCNC: 4.9 GM/DL — LOW (ref 6–8.3)
PROTHROM AB SERPL-ACNC: 25.3 SEC — HIGH (ref 10.6–13.6)
RBC # BLD: 3.14 M/UL — LOW (ref 4.2–5.8)
RBC # FLD: 20.3 % — HIGH (ref 10.3–14.5)
SARS-COV-2 IGG+IGM SERPL QL IA: >250 U/ML — HIGH
SARS-COV-2 IGG+IGM SERPL QL IA: POSITIVE
SODIUM SERPL-SCNC: 139 MMOL/L — SIGNIFICANT CHANGE UP (ref 135–145)
WBC # BLD: 7.61 K/UL — SIGNIFICANT CHANGE UP (ref 3.8–10.5)
WBC # FLD AUTO: 7.61 K/UL — SIGNIFICANT CHANGE UP (ref 3.8–10.5)

## 2021-10-18 PROCEDURE — 93970 EXTREMITY STUDY: CPT | Mod: 26

## 2021-10-18 PROCEDURE — 99232 SBSQ HOSP IP/OBS MODERATE 35: CPT

## 2021-10-18 PROCEDURE — 99222 1ST HOSP IP/OBS MODERATE 55: CPT

## 2021-10-18 PROCEDURE — 99497 ADVNCD CARE PLAN 30 MIN: CPT | Mod: 25

## 2021-10-18 RX ORDER — POLYETHYLENE GLYCOL 3350 17 G/17G
17 POWDER, FOR SOLUTION ORAL DAILY
Refills: 0 | Status: DISCONTINUED | OUTPATIENT
Start: 2021-10-18 | End: 2021-10-19

## 2021-10-18 RX ORDER — MORPHINE SULFATE 50 MG/1
2 CAPSULE, EXTENDED RELEASE ORAL EVERY 4 HOURS
Refills: 0 | Status: DISCONTINUED | OUTPATIENT
Start: 2021-10-18 | End: 2021-10-18

## 2021-10-18 RX ADMIN — AMLODIPINE BESYLATE 2.5 MILLIGRAM(S): 2.5 TABLET ORAL at 05:47

## 2021-10-18 RX ADMIN — SENNA PLUS 1 TABLET(S): 8.6 TABLET ORAL at 18:23

## 2021-10-18 RX ADMIN — FAMOTIDINE 20 MILLIGRAM(S): 10 INJECTION INTRAVENOUS at 13:14

## 2021-10-18 RX ADMIN — RIVAROXABAN 15 MILLIGRAM(S): KIT at 09:50

## 2021-10-18 RX ADMIN — RIVAROXABAN 15 MILLIGRAM(S): KIT at 18:23

## 2021-10-18 RX ADMIN — SENNA PLUS 1 TABLET(S): 8.6 TABLET ORAL at 05:47

## 2021-10-18 RX ADMIN — TAMSULOSIN HYDROCHLORIDE 0.4 MILLIGRAM(S): 0.4 CAPSULE ORAL at 21:16

## 2021-10-18 NOTE — CONSULT NOTE ADULT - TIME BILLING
evaluation of pt, review of records, collaboration with care teams and family   in addition 30minute phone discussion regarding advance care planning held

## 2021-10-18 NOTE — PROGRESS NOTE ADULT - ASSESSMENT
89 yo M with a history of CAD, HTN, HLD, BPH, gallbladder disease s/p biliary stent in 2018, metastatic prostate cancer who was sent to the ED from [facility] secondary to dyspnea after getting 2 units of PRBCs and was found to have bilateral pulmonary emboli & bilateral DVTs.    B/l pulmonary embolism and bilateral DVTs  - CT chest showed pulmonary emboli in the right upper lobar pulmonary artery extending into segmental and subsegmental branches, right middle lobar and right lower lobar segmental and subsegmental branches, and a few left upper lobar and lower lobar segmental and subsegmental branches w/ a moderate left and small right pleural effusion with associated compressive atelectasis  - LE US showed acute partially occlusive thrombus in the left common femoral and femoral veins as well as the right femoral vein  - I spoke w/ his daughter and offered coumadin vs DOAC. She decided on DAOC and he has been started on Xarelto  - wean off oxygen    Pancreas cancer  - CT showed the known pancreatic head mass, which was poorly visualized, as well as, scattered low-attenuation lesions in the liver concerning for metastatic disease w/ a small volume of ascites  - c/w oxycodone prn  - daughter stated that she would like for the patient to ultimately go home on home hospice    GERD  - CT showed Mild thickening of the mid to distal esophageal walls concerning for esophagitis  - c/w pepcid    Essential hypertension  - c/w amlodipine    BPH without urinary obstruction  - c/w tamsulosin    Constipation  - c/w Miralax, senna w/ PRN dulcolax     Prophylaxis:  DVT: Xarelto  GI: Pepcid

## 2021-10-18 NOTE — PROGRESS NOTE ADULT - SUBJECTIVE AND OBJECTIVE BOX
89 yo M with a history of CAD, HTN, HLD, BPH, gallbladder disease s/p biliary stent in 2018, metastatic prostate cancer who was sent to the ED from [facility] secondary to dyspnea after getting 2 units of PRBCs and was found to have bilateral pulmonary emboli & bilateral DVTs. He is lying in bed in NAD.     MEDICATIONS  (STANDING):  amLODIPine   Tablet 2.5 milliGRAM(s) Oral daily  famotidine    Tablet 20 milliGRAM(s) Oral daily  polyethylene glycol 3350 17 Gram(s) Oral daily  rivaroxaban 15 milliGRAM(s) Oral two times a day with meals  senna 1 Tablet(s) Oral two times a day  tamsulosin 0.4 milliGRAM(s) Oral at bedtime    MEDICATIONS  (PRN):  bisacodyl Suppository 10 milliGRAM(s) Rectal daily PRN Constipation  morphine  - Injectable 2 milliGRAM(s) IV Push every 4 hours PRN Severe Pain (7 - 10)/labored breathing  oxyCODONE    IR 2.5 milliGRAM(s) Oral every 4 hours PRN Moderate Pain (4 - 6)      Allergies    Cheese (Unknown)  Corn (Unknown)  No Known Drug Allergies    Intolerances        Vital Signs Last 24 Hrs  T(C): 36.3 (18 Oct 2021 17:32), Max: 36.4 (18 Oct 2021 05:09)  T(F): 97.4 (18 Oct 2021 17:32), Max: 97.5 (18 Oct 2021 05:09)  HR: 101 (18 Oct 2021 17:32) (88 - 101)  BP: 125/81 (18 Oct 2021 17:32) (116/74 - 138/84)   RR: 17 (18 Oct 2021 17:32) (17 - 18)  SpO2: 98% (18 Oct 2021 17:32) (96% - 98%)    PHYSICAL EXAM:  GENERAL: NAD, well-groomed, well-developed  HEAD:  Atraumatic, Normocephalic  EYES: EOMI, PERRLA   NECK: Supple   NERVOUS SYSTEM:  Alert    CHEST/LUNG: Clear to auscultation bilaterally; No rales, rhonchi, wheezing, or rubs  HEART: Regular rate and rhythm; No murmurs, rubs, or gallops  ABDOMEN: Soft, Nontender, Nondistended; Bowel sounds present  EXTREMITIES:  No clubbing, cyanosis, or edema       LABS:                        9.7    7.61  )-----------( 416      ( 18 Oct 2021 07:19 )             29.0     10-18    139  |  110<H>  |  29<H>  ----------------------------<  101<H>  4.0   |  24  |  0.88    Ca    8.0<L>      18 Oct 2021 07:19  Phos  2.9     10-18  Mg     2.2     10-18    TPro  4.9<L>  /  Alb  1.4<L>  /  TBili  1.0  /  DBili  x   /  AST  18  /  ALT  19  /  AlkPhos  1325<H>  10-18    PT/INR - ( 18 Oct 2021 07:19 )   PT: 25.3 sec;   INR: 2.27 ratio         PTT - ( 18 Oct 2021 07:19 )  PTT:36.0 sec       RADIOLOGY & ADDITIONAL TESTS:    10-18-21 @ 07:01  -  10-18-21 @ 18:46  --------------------------------------------------------  IN:  Total IN: 0 mL    OUT:    Voided (mL): 200 mL  Total OUT: 200 mL    Total NET: -200 mL

## 2021-10-18 NOTE — CONSULT NOTE ADULT - ASSESSMENT
89yoM  w PMH of LBP s/p L hip arthroplasty (ab 13 years ago), unspecified gallbadder disease s/p [facility], CAD, HTN, BPH who is admitted from [facility] for shortness of breath found to have PE, DVT. Palliative Consulted for GOC, symptom management.

## 2021-10-18 NOTE — CONSULT NOTE ADULT - SUBJECTIVE AND OBJECTIVE BOX
HPI:  Patient is a 89M with a PMH of CAD, HTN, BPH, ?gallbladder disease s/p biliary stent in 2018, metastatic prostate cancer who was sent to the ED from [facility] secondary to dyspnea.  History limited as patient is severely hard of hearing.  Patient was to receive 2 units of PRBCs this am however reported chest pain and dyspnea after the first unit.  Was given lasix at [facility] and was transferred to the ED.  Patient currently has no acute complaints, expresses frustration as he has been awake all night.  Reportedly hypoxic on room air, current SpO2 97% on 3L via NC.  Labs benign.  CT angio chest reveals R sided pulmonary emboli.  Will admit to tele.  (17 Oct 2021 07:26)    PERTINENT PM/SXH:   Hypertension    Hyperlipidemia    BPH (Benign Prostatic Hyperplasia)    Gall bladder disease- Pt has Gallbladder Cancer, Pancreatic Ca    CAD (coronary artery disease)      Hernia of abdominal cavity    History of biliary stent insertion    History of arthroplasty of left hip      FAMILY HISTORY:  FH: HTN (hypertension) (Child)      ITEMS NOT CHECKED ARE NOT PRESENT    SOCIAL HISTORY: - [facility] [place]  Significant other/partner[ ]  Children[x ]  Holiness/Spirituality: Tenriism   Substance hx:  [ ]   Tobacco hx:  [ ]   Alcohol hx: [ ]   Home Opioid hx:  [ ] I-Stop Reference No:[id number]    Others' Prescriptions  Patient Name: Jeff EmersonBirth Date: 03/12/1918  Address: [address] [place], [place] [address]Sex: Male  Rx Written	Rx Dispensed	Drug	Quantity	Days Supply	Prescriber Name	Prescriber Ev #	Payment Method	Dispenser  10/15/2021	10/15/2021	oxycodone hcl 5 mg tablet	12	4	Roni Valencia	[id number]	[place]    Living Situation: [ ]Home  [ ]Long term care  [ x]Rehab [ ]Other    ADVANCE DIRECTIVES:    DNR  MOLST  [ ]  Living Will  [ ]   DECISION MAKER(s):  [ ] Health Care Proxy(s)  [ x] Surrogate(s)  [ ] Guardian           Name(s): Phone Number(s): Anna     BASELINE (I)ADL(s) (prior to admission):  [place]: [ ]Total  [x ] Moderate [ ]Dependent    Allergies    Cheese (Unknown)  Corn (Unknown)  No Known Drug Allergies    Intolerances    MEDICATIONS  (STANDING):  amLODIPine   Tablet 2.5 milliGRAM(s) Oral daily  famotidine    Tablet 20 milliGRAM(s) Oral daily  rivaroxaban 15 milliGRAM(s) Oral two times a day with meals  senna 1 Tablet(s) Oral two times a day  tamsulosin 0.4 milliGRAM(s) Oral at bedtime    MEDICATIONS  (PRN):  oxyCODONE    IR 2.5 milliGRAM(s) Oral every 4 hours PRN Moderate Pain (4 - 6)    PRESENT SYMPTOMS: [ ]Unable to obtain due to poor mentation   Source if other than patient:  [ ]Family   [ ]Team     Pain: [ x]yes [ ]no  QOL impact - can't express  Location - across my chest       Aggravating factors - none known  Quality - sharp  Radiation - none  Timing- comes and goes  Severity (0-10 scale):   Minimal acceptable level (0-10 scale):     CPOT:    https://www.[facility].org/getattachment/jia86r02-4t4j-2a1v-2x0s-5176d8013d1a/Critical-Care-Pain-Observation-Tool-(CPOT)      PAIN AD Score:     http://geriatrictoolkit.[facility]/cog/painad.pdf (press ctrl +  left click to view)    Dyspnea:                           [ x]Mild [ ]Moderate [ ]Severe  Anxiety:                             [ ]Mild [ ]Moderate [ ]Severe  Fatigue:                             [ ]Mild [ ]Moderate [ ]Severe  Nausea:                             [ ]Mild [ ]Moderate [ ]Severe  Loss of appetite:              [ ]Mild [ ]Moderate [ x]Severe  Constipation:                    [ ]Mild [ ]Moderate [ ]Severe    Other Symptoms:  [x ]All other review of systems negative     Palliative Performance Status Version 2:       20  %    http://npcrc.org/files/news/palliative_performance_scale_ppsv2.pdf  PHYSICAL EXAM:  Vital Signs Last 24 Hrs  T(C): 36.3 (18 Oct 2021 11:36), Max: 36.7 (17 Oct 2021 17:18)  T(F): 97.3 (18 Oct 2021 11:36), Max: 98 (17 Oct 2021 17:18)  HR: 94 (18 Oct 2021 11:36) (88 - 94)  BP: 116/74 (18 Oct 2021 11:36) (116/74 - 138/84)  BP(mean): --  RR: 17 (18 Oct 2021 11:36) (17 - 18)  SpO2: 98% (18 Oct 2021 11:36) (96% - 98%) I&O's Summary    GENERAL:  [x ]Alert  [x ]Oriented x 3  [ ]Lethargic  [ x]Cachexia  [ ]Unarousable  [ x]Verbal  [ ]Non-Verbal  Behavioral:   [ ] Anxiety  [ ] Delirium [ ] Agitation [ ] Other  HEENT:  [ ]Normal   [ x]Dry mouth   [ ]ET Tube/Trach  [ ]Oral lesions  PULMONARY:   [x ]Clear [ ]Tachypnea  [ ]Audible excessive secretions   [ ]Rhonchi        [ ]Right [ ]Left [ ]Bilateral  [ ]Crackles        [ ]Right [ ]Left [ ]Bilateral  [ ]Wheezing     [ ]Right [ ]Left [ ]Bilateral  [x ]Diminished breath sounds [ ]right [ ]left [x ]bilateral  CARDIOVASCULAR:    [ ]Regular [ ]Irregular [ ]Tachy  [ ]Reuben [ ]Murmur [ ]Other  GASTROINTESTINAL:  [x ]Soft  [ ]Distended   [ ]+BS  [ ]Non tender [ x]Tender  [ ]PEG [ ]OGT/ NGT  Last BM: 10/17  GENITOURINARY:  [ ]Normal [x ] Incontinent   [ ]Oliguria/Anuria   [ ]Payan  MUSCULOSKELETAL:   [ ]Normal   [x ]Weakness  [ ]Bed/Wheelchair bound [ ]Edema  NEUROLOGIC:   [ ]No focal deficits  [ x]Cognitive impairment  [ ]Dysphagia [ ]Dysarthria [ ]Paresis [ ]Other   SKIN:   [ ]Normal    [ ]Rash  [x ]Pressure ulcer(s)    stage I sacrum   Present on admission [x ]y [ ]n    CRITICAL CARE:  [ ] Shock Present  [ ]Septic [ ]Cardiogenic [ ]Neurologic [ ]Hypovolemic  [ ]  Vasopressors [ ]  Inotropes   [ ]Respiratory failure present [ ]Mechanical ventilation [ ]Non-invasive ventilatory support [ ]High flow    [ ]Acute  [ ]Chronic [ ]Hypoxic  [ ]Hypercarbic [ ]Other  [ ]Other organ failure     LABS:                        9.7    7.61  )-----------( 416      ( 18 Oct 2021 07:19 )             29.0   10-18    139  |  110<H>  |  29<H>  ----------------------------<  101<H>  4.0   |  24  |  0.88    Ca    8.0<L>      18 Oct 2021 07:19  Phos  2.9     10-18  Mg     2.2     10-18    TPro  4.9<L>  /  Alb  1.4<L>  /  TBili  1.0  /  DBili  x   /  AST  18  /  ALT  19  /  AlkPhos  1325<H>  10-18  PT/INR - ( 18 Oct 2021 07:19 )   PT: 25.3 sec;   INR: 2.27 ratio         PTT - ( 18 Oct 2021 07:19 )  PTT:36.0 sec      RADIOLOGY & ADDITIONAL STUDIES:       < from: CT Angio Chest PE Protocol w/ IV Cont (10.17.21 @ 06:10) >    EXAM:  CT ANGIO CHEST PULM [facility]                            PROCEDURE DATE:  10/17/2021          INTERPRETATION:  CLINICAL INFORMATION: Hypoxia and shortness of breath from rehabilitation. History of pancreatic carcinoma.    COMPARISON: None.    CONTRAST/COMPLICATIONS:  IV Contrast: Omnipaque 350  48 cc administered   52 cc discarded  Oral Contrast: NONE  Complications: None reported at time of study completion    PROCEDURE:  CT Angiography of the Chest.  Sagittal and coronal reformats wereperformed as well as 3D (MIP) reconstructions.    FINDINGS:    LUNGS AND AIRWAYS AND PLEURA: Patent central airways.  Moderate left and small right pleural effusion with associated compressive atelectasis. Mild smooth intralobular septal thickening. Scattered bands of platelike atelectasis.  MEDIASTINUM AND STEFANO: Enlarged right paraesophageal lymph node measuring up to 2.5 x 1.6 cm (series 7:103). Mild thickening of the mid to distal esophageal walls with slight infiltration of the paraesophagealfat.  VESSELS: Pulmonary emboli in the right upper lobar pulmonary artery extending into segmental and subsegmental branches, right middle lobar and right lower lobar segmental and subsegmental branches, and a few left upper lobar and lower lobar segmental and subsegmental branches. Main pulmonary artery is not dilated. Thoracic aorta is normal in caliber. Mild atherosclerotic calcification of the thoracic aorta and coronary arteries.  HEART: Heart is not enlarged. No pericardial effusion.  CHESTWALL AND LOWER NECK: Generalized soft tissue edema.  VISUALIZED UPPER ABDOMEN: Scattered low-attenuation lesions in the liver concerning for metastatic disease. Stent in the common bile duct. Known pancreatic head mass is poorly visualized on this exam. Distal pancreatic atrophy and pancreatic ductal dilatation. Small volume of ascites.  BONES: Multilevel degenerative changes of the spine.    IMPRESSION:  Pulmonary emboli in the right upper lobar pulmonary artery extending into segmental and subsegmental branches, right middle lobar and right lower lobar segmental and subsegmental branches, and a few left upper lobar and lower lobar segmental and subsegmental branches    Moderate left and small right pleural effusion with associated compressive atelectasis.    Mild thickening of the mid to distal esophageal walls concerning for esophagitis. Enlarged paraesophageal lymph nodes likely reactive.    Known pancreatic head mass is poorly visualized on this exam. Scattered low-attenuation lesions in the liver concerning for metastatic disease. Small volume of ascites.    Critical findings were discussed with Dr. Elliott of the emergency Department at 6:14 AM on 10/17/2021.    --- End of Report ---            EH BARBER DO; Attending Radiologist  This document has been electronically signed. Oct 17 2021  6:22AM    < end of copied text >    < from: US Duplex Venous Lower Ext Complete, Bilateral (10.18.21 @ 11:31) >    EXAM:  US DPLX LWR EXT VEINS COMPL BI                            PROCEDURE DATE:  10/18/2021          INTERPRETATION:  CLINICAL INFORMATION: Short of breath, PE, lower extremity swelling    COMPARISON: None available.    TECHNIQUE: Duplex sonography of the BILATERAL LOWER extremity veins with color and spectral Doppler, with and without compression.    FINDINGS:    RIGHT:    There is acute partially occlusive thrombus within the right femoral vein.    The right common femoral and popliteal veins are patent and free of thrombus.    The right posterior tibial and peroneal veins are patent and free of thrombus.    LEFT:    There is acute partially occlusive thrombus in the left common femoral and femoral veins.    The left popliteal, posteriortibial and peroneal veins are patent and free of clot.      IMPRESSION:    There is acute partially occlusive thrombus within the right femoral vein.    There is acute partially occlusive thrombus in the left common femoral and femoral veins.    --- End of Report ---            CAMMIE ARGUELLO MD; Attending Interventional Radiologist  This document has been electronically signed. Oct 18 2021 12:00PM    < end of copied text >  < from: CT Abdomen and Pelvis w/ IV Cont (10.08.21 @ 12:41) >    EXAM:  CT ABDOMEN AND PELVIS IC        PROCEDURE DATE:  Oct  8 2021         INTERPRETATION:  CLINICAL INFORMATION: Liver lesions.    COMPARISON: 6/2/2015    CONTRAST/COMPLICATIONS:  IV Contrast: Omnipaque 350  90 cc administered   10 cc discarded  Oral Contrast: NONE  Complications: None reported at time of study completion    PROCEDURE:  CT of the Abdomen and Pelvis was performed.  Sagittal and coronal reformats were performed.    FINDINGS:  LOWER CHEST: Moderate left and small right pleural effusion with adjacent passive atelectasis.    LIVER: Atrophy of the right hepatic lobe. Numerous hypoenhancing liver lesions, the largest measuring 2.8 x 2.2 cm in segment VII, most consistent with metastatic disease.  BILE DUCTS: CBD stent terminatesin the second portion of the duodenum. Pneumobilia. Mild intrahepatic biliary ductal dilatation.  GALLBLADDER: Not visualized.  SPLEEN: Within normal limits.  PANCREAS: Soft tissue prominence of the pancreatic head measuring 4.3 x 3.1 cm with abrupt termination of the pancreatic duct and distal pancreatic atrophy, concerning for pancreatic adenocarcinoma.    Location: Head  Size: 4.3 x 3.1 cm.Series/Image: 7:54  Variant Hepatic Arterial Anatomy: NO.  Arterial Involvement (< 180, > 180): YES. Circumferential encasement of the SMA.  Venous Involvement (<180, > 180, vessel deformity, vessel occlusion): Less than 180 degrees abutment of the main portal vein without vessel deformity or occlusion.    ADRENALS: Within normal limits.  KIDNEYS/URETERS: Right upper pole renal cyst and subcentimeter renal lesions too small to characterize.    BLADDER: Diffuse thickening, which may reflect chronic changes of bladder outlet obstruction.  REPRODUCTIVE ORGANS: Obscured due to streak artifact from bilateral total hip arthroplasty.    BOWEL: No bowel obstruction. Appendix is not visualized. Wall thickening of the right colon  PERITONEUM: Small amount of ascites.  VESSELS: Left portal vein is dilated. Thrombosis of the right portal vein.  RETROPERITONEUM/LYMPH NODES: No lymphadenopathy.  ABDOMINAL WALL: Anasarca.  BONES: Left hip arthroplasty. Osteopenia and degenerative changes.    IMPRESSION:  Findings most consistent with metastatic pancreatic adenocarcinoma.    Right portal venous thrombosis and prominent dilatation of the left portal vein.    Pneumobilia and intrahepatic biliary ductal dilatation in the left and right hepatic lobes.    Wall thickening of the right colon, likely portal colopathy.    Ascites, anasarca, and bilateral pleural effusions.    --- End of Report ---              EMELIA WESTON MD; Attending Radiologist  This document has been electronically signed. Oct  8 2021  1:35PM    < end of copied text >  PROTEIN CALORIE MALNUTRITION PRESENT: [ ]mild [ ]moderate [ x]severe [ ]underweight [ ]morbid obesity  https://www.andeal.org/vault/2440/web/files/ONC/Table_Clinical%20Characteristics%20to%20Document%20Malnutrition-White%20JV%20et%20al%202012.pdf    Height (cm): 167.6 (10-17-21 @ 11:37), 170.2 (10-05-21 @ 17:05), 170.2 (09-26-21 @ 23:37)  Weight (kg): 59.5 (10-17-21 @ 11:37), 56.5 (10-10-21 @ 10:03)  BMI (kg/m2): 21.2 (10-17-21 @ 11:37), 19.5 (10-10-21 @ 10:03)    [x ]PPSV2 < or = to 30% [x ]significant weight loss  [x ]poor nutritional intake  [ ]anasarca      [ ]Artificial Nutrition      REFERRALS:   [ ]Chaplaincy  [ x]Hospice  [ ]Child Life  [ x]Social Work  [x ]Case management [ ]Holistic Therapy     Goals of Care Document:

## 2021-10-18 NOTE — CONSULT NOTE ADULT - PROBLEM SELECTOR RECOMMENDATION 9
due to PE, pleural effusion  continue supplemental oxygen  can use low dose opioids for resp distress spoke at length to pt's daughter and confirmed pt's wishes are for DNR/I see GOC above  there is a desire to reattempt rehab and transition to home with hospice services vs. placement. Pt is a  and has been seen at  [facility] and more recently [place], he was also referred to [facility] hospice when at [facility] just about a week ago. SW made aware of this to help with dispo planning  Dr. Whitmore updated

## 2021-10-18 NOTE — CONSULT NOTE ADULT - PROBLEM SELECTOR RECOMMENDATION 2
pain can be related to PEs, cancer  was on oxycodone 5mg per ISTOP which is continued here  will order IV morphine in case of severe pain and /or inability to take oral

## 2021-10-18 NOTE — CONSULT NOTE ADULT - CONVERSATION DETAILS
spoke with pt's daughter Anna Emerson , to discuss her father's wishes. I visited pt earlier who could not recall his documents that he joslyn up in the past and said he "had his fingers crossed" in terms of his livelihood. Anna recalled her [facility] when he was initially told he had cancer. She also shared that she recently lost her brother who went through CPR and advanced measures and was not able to be revived. She stated she would not want her father to under go such "torture" and that he himself had said it in the past. She does not have a copy of any forms of DNR and had even spoken to staff at [facility] about it, she agreed to do a new MOLST form now with me via phone and that I would leave her a copy at the bedside for her. She agrees to care within medical indications (treat what is treatable) and symptom relief. She has discussed hospice on prior occasions and was curious about her father's [facility] connection if that could help in terms of hospice and care moving forward. I counseled her that it very likely would and would have  assess her father's case and discuss with her further.

## 2021-10-18 NOTE — CONSULT NOTE ADULT - PROBLEM SELECTOR RECOMMENDATION 5
on eliquis   would consider high risk of bleeding with advanced age, malignancy   risk benefit ratio should be carefully weighed here

## 2021-10-19 LAB
ANION GAP SERPL CALC-SCNC: 8 MMOL/L — SIGNIFICANT CHANGE UP (ref 5–17)
BUN SERPL-MCNC: 26 MG/DL — HIGH (ref 7–23)
CALCIUM SERPL-MCNC: 8.1 MG/DL — LOW (ref 8.5–10.1)
CHLORIDE SERPL-SCNC: 110 MMOL/L — HIGH (ref 96–108)
CO2 SERPL-SCNC: 23 MMOL/L — SIGNIFICANT CHANGE UP (ref 22–31)
CREAT SERPL-MCNC: 0.8 MG/DL — SIGNIFICANT CHANGE UP (ref 0.5–1.3)
GLUCOSE SERPL-MCNC: 86 MG/DL — SIGNIFICANT CHANGE UP (ref 70–99)
HCT VFR BLD CALC: 29.6 % — LOW (ref 39–50)
HGB BLD-MCNC: 9.7 G/DL — LOW (ref 13–17)
MAGNESIUM SERPL-MCNC: 2.4 MG/DL — SIGNIFICANT CHANGE UP (ref 1.6–2.6)
MCHC RBC-ENTMCNC: 31.3 PG — SIGNIFICANT CHANGE UP (ref 27–34)
MCHC RBC-ENTMCNC: 32.8 GM/DL — SIGNIFICANT CHANGE UP (ref 32–36)
MCV RBC AUTO: 95.5 FL — SIGNIFICANT CHANGE UP (ref 80–100)
NRBC # BLD: 0 /100 WBCS — SIGNIFICANT CHANGE UP (ref 0–0)
PHOSPHATE SERPL-MCNC: 2.7 MG/DL — SIGNIFICANT CHANGE UP (ref 2.5–4.5)
PLATELET # BLD AUTO: 403 K/UL — HIGH (ref 150–400)
POTASSIUM SERPL-MCNC: 4 MMOL/L — SIGNIFICANT CHANGE UP (ref 3.5–5.3)
POTASSIUM SERPL-SCNC: 4 MMOL/L — SIGNIFICANT CHANGE UP (ref 3.5–5.3)
RBC # BLD: 3.1 M/UL — LOW (ref 4.2–5.8)
RBC # FLD: 20.4 % — HIGH (ref 10.3–14.5)
SODIUM SERPL-SCNC: 141 MMOL/L — SIGNIFICANT CHANGE UP (ref 135–145)
WBC # BLD: 5.81 K/UL — SIGNIFICANT CHANGE UP (ref 3.8–10.5)
WBC # FLD AUTO: 5.81 K/UL — SIGNIFICANT CHANGE UP (ref 3.8–10.5)

## 2021-10-19 PROCEDURE — 99232 SBSQ HOSP IP/OBS MODERATE 35: CPT

## 2021-10-19 PROCEDURE — 99233 SBSQ HOSP IP/OBS HIGH 50: CPT

## 2021-10-19 RX ADMIN — RIVAROXABAN 15 MILLIGRAM(S): KIT at 08:07

## 2021-10-19 RX ADMIN — SENNA PLUS 1 TABLET(S): 8.6 TABLET ORAL at 06:03

## 2021-10-19 RX ADMIN — RIVAROXABAN 15 MILLIGRAM(S): KIT at 17:19

## 2021-10-19 RX ADMIN — TAMSULOSIN HYDROCHLORIDE 0.4 MILLIGRAM(S): 0.4 CAPSULE ORAL at 21:30

## 2021-10-19 RX ADMIN — FAMOTIDINE 20 MILLIGRAM(S): 10 INJECTION INTRAVENOUS at 11:14

## 2021-10-19 NOTE — PROGRESS NOTE ADULT - ASSESSMENT
89 yo M with a history of CAD, HTN, HLD, BPH, gallbladder disease s/p biliary stent in 2018, metastatic prostate cancer who was sent to the ED from [facility] secondary to dyspnea after getting 2 units of PRBCs and was found to have bilateral pulmonary emboli & bilateral DVTs.    B/l pulmonary embolism and bilateral DVTs  - CT chest showed pulmonary emboli in the right upper lobar pulmonary artery extending into segmental and subsegmental branches, right middle lobar and right lower lobar segmental and subsegmental branches, and a few left upper lobar and lower lobar segmental and subsegmental branches w/ a moderate left and small right pleural effusion with associated compressive atelectasis  - LE US showed acute partially occlusive thrombus in the left common femoral and femoral veins as well as the right femoral vein  - I spoke w/ his daughter and offered coumadin vs DOAC. She decided on DAOC and he has been started on Xarelto  - wean off oxygen    Pancreas cancer  - CT showed the known pancreatic head mass, which was poorly visualized, as well as, scattered low-attenuation lesions in the liver concerning for metastatic disease w/ a small volume of ascites  - c/w oxycodone prn  - daughter stated that she would like for the patient to ultimately go home on home hospice    GERD  - CT showed Mild thickening of the mid to distal esophageal walls concerning for esophagitis  - c/w Pepcid    Essential hypertension  - c/w amlodipine    BPH without urinary obstruction  - c/w tamsulosin    Constipation  - c/w Miralax, senna w/ PRN dulcolax     Prophylaxis:  DVT: Xarelto  GI: Pepcid    Code Status: Full Code    Dispo: rehab     I called and spoke w/ his daughter, Anna 89 yo M with a history of CAD, HTN, HLD, BPH, gallbladder disease s/p biliary stent in 2018, metastatic prostate cancer who was sent to the ED from [facility] secondary to dyspnea after getting 2 units of PRBCs and was found to have bilateral pulmonary emboli & bilateral DVTs.    B/l pulmonary embolism and bilateral DVTs  - CT chest showed pulmonary emboli in the right upper lobar pulmonary artery extending into segmental and subsegmental branches, right middle lobar and right lower lobar segmental and subsegmental branches, and a few left upper lobar and lower lobar segmental and subsegmental branches w/ a moderate left and small right pleural effusion with associated compressive atelectasis  - LE US showed acute partially occlusive thrombus in the left common femoral and femoral veins as well as the right femoral vein  - I spoke w/ his daughter and offered coumadin vs DOAC. She decided on DAOC and he has been started on Xarelto  - wean off oxygen    Pancreas cancer  - CT showed the known pancreatic head mass, which was poorly visualized, as well as, scattered low-attenuation lesions in the liver concerning for metastatic disease w/ a small volume of ascites  - c/w oxycodone prn  - daughter stated that she would like for the patient to ultimately go home on home hospice    GERD  - CT showed Mild thickening of the mid to distal esophageal walls concerning for esophagitis  - c/w Pepcid    Essential hypertension  - c/w amlodipine    BPH without urinary obstruction  - c/w tamsulosin    Constipation  - c/w Miralax, senna w/ PRN dulcolax     Prophylaxis:  DVT: Xarelto  GI: Pepcid    Code Status: Full Code    Dispo: rehab     I called and spoke w/ his daughter, Anna. She would like to be called every day with an update. She also noted that she doesn't want the pt's code status to be discussed with him.

## 2021-10-19 NOTE — PROGRESS NOTE ADULT - SUBJECTIVE AND OBJECTIVE BOX
SUBJECTIVE AND OBJECTIVE: feeling ok   INTERVAL HPI/OVERNIGHT EVENTS:     DNR on chart: yes  Allergies    Cheese (Unknown)  Corn (Unknown)  No Known Drug Allergies    Intolerances    MEDICATIONS  (STANDING):  amLODIPine   Tablet 2.5 milliGRAM(s) Oral daily  famotidine    Tablet 20 milliGRAM(s) Oral daily  polyethylene glycol 3350 17 Gram(s) Oral daily  rivaroxaban 15 milliGRAM(s) Oral two times a day with meals  senna 1 Tablet(s) Oral two times a day  tamsulosin 0.4 milliGRAM(s) Oral at bedtime    MEDICATIONS  (PRN):  bisacodyl Suppository 10 milliGRAM(s) Rectal daily PRN Constipation  oxyCODONE    IR 2.5 milliGRAM(s) Oral every 4 hours PRN Moderate Pain (4 - 6)      ITEMS UNCHECKED ARE NOT PRESENT    PRESENT SYMPTOMS: [ ]Unable to obtain due to poor mentation   Source if other than patient:  [ ]Family   [ ]Team     Pain:  [ ]yes [ ]no  QOL impact -   Location -                    Aggravating factors -  Quality -  Radiation -  Timing-  Severity (0-10 scale):  Minimal acceptable level (0-10 scale):     Dyspnea:                           [ ]Mild [ ]Moderate [ ]Severe  Anxiety:                             [ ]Mild [ ]Moderate [ ]Severe  Fatigue:                             [ ]Mild [ ]Moderate [ ]Severe  Nausea:                             [ ]Mild [ ]Moderate [ ]Severe  Loss of appetite:              [ ]Mild [ ]Moderate [ ]Severe  Constipation:                    [ ]Mild [ ]Moderate [ ]Severe    CPOT:    https://www.[facility].org/getattachment/[email or web address]/Critical-Care-Pain-Observation-Tool-(CPOT)    PAIN AD Score:	  http://geriatrictoolkit.[place].[facility]/cog/painad.pdf (Ctrl + left click to view)    Other Symptoms:  [ ]All other review of systems negative     Palliative Performance Status Version 2:         20-30%      http://npcrc.org/files/news/palliative_performance_scale_ppsv2.pdf  PHYSICAL EXAM:  Vital Signs Last 24 Hrs  T(C): 36.7 (19 Oct 2021 05:37), Max: 36.7 (19 Oct 2021 05:37)  T(F): 98.1 (19 Oct 2021 05:37), Max: 98.1 (19 Oct 2021 05:37)  HR: 100 (19 Oct 2021 05:37) (94 - 101)  BP: 120/76 (19 Oct 2021 05:37) (116/74 - 144/80)  BP(mean): --  RR: 18 (19 Oct 2021 05:37) (17 - 18)  SpO2: 96% (19 Oct 2021 05:37) (96% - 98%) I&O's Summary    18 Oct 2021 07:01  -  19 Oct 2021 07:00  --------------------------------------------------------  IN: 0 mL / OUT: 400 mL / NET: -400 mL    19 Oct 2021 07:01  -  19 Oct 2021 11:27  --------------------------------------------------------  IN: 0 mL / OUT: 100 mL / NET: -100 mL       GENERAL:  [ x]Alert  [ x]Oriented x 3  [ ]Lethargic  [ x]Cachexia  [ ]Unarousable  [ x]Verbal  [ ]Non-Verbal  Behavioral:   [ ]Anxiety  [ ]Delirium [ ]Agitation [ ]Other  HEENT:  [ ]Normal   [x ]Dry mouth   [ ]ET Tube/Trach  [ ]Oral lesions  PULMONARY:   [x ]Clear [ ]Tachypnea  [ ]Audible excessive secretions   [ ]Rhonchi        [ ]Right [ ]Left [ ]Bilateral  [ ]Crackles        [ ]Right [ ]Left [ ]Bilateral  [ ]Wheezing     [ ]Right [ ]Left [ ]Bilateral  [ ]Diminished BS [ ] Right [ ]Left [ ]Bilateral  CARDIOVASCULAR:    [ ]Regular [ ]Irregular [ x]Tachy  [ ]Reuben [ ]Murmur [ ]Other  GASTROINTESTINAL:  [ x]Soft  [ ]Distended   [x ]+BS  [ x]Non tender [ ]Tender  [ ]PEG [ ]OGT/ NGT   Last BM:  10/19  GENITOURINARY:  [x ]Normal [ ]Incontinent   [ ]Oliguria/Anuria   [ ]Payan  MUSCULOSKELETAL:   [ ]Normal   [ x]Weakness  [ ]Bed/Wheelchair bound [ ]Edema  NEUROLOGIC:   [ ]No focal deficits  [x ] Cognitive impairment  [ ] Dysphagia [ ]Dysarthria [ ] Paresis [ ]Other   SKIN:   [ ]Normal  [ ]Rash   [ x]Pressure ulcer(s) stage I sacrum  [x ]y [ ]n present on admission    CRITICAL CARE:  [ ]Shock Present  [ ]Septic [ ]Cardiogenic [ ]Neurologic [ ]Hypovolemic  [ ]Vasopressors [ ]Inotropes  [ ]Respiratory failure present [ ]Mechanical Ventilation [ ]Non-invasive ventilatory support [ ]High-Flow   [ ]Acute  [ ]Chronic [ ]Hypoxic  [ ]Hypercarbic [ ]Other  [ ]Other organ failure     LABS:                        9.7    5.81  )-----------( 403      ( 19 Oct 2021 07:19 )             29.6   10-19    141  |  110<H>  |  26<H>  ----------------------------<  86  4.0   |  23  |  0.80    Ca    8.1<L>      19 Oct 2021 07:19  Phos  2.7     10-19  Mg     2.4     10-19    TPro  4.9<L>  /  Alb  1.4<L>  /  TBili  1.0  /  DBili  x   /  AST  18  /  ALT  19  /  AlkPhos  1325<H>  10-18  PT/INR - ( 18 Oct 2021 07:19 )   PT: 25.3 sec;   INR: 2.27 ratio         PTT - ( 18 Oct 2021 07:19 )  PTT:36.0 sec      RADIOLOGY & ADDITIONAL STUDIES:     Protein Calorie Malnutrition Present: [ ]mild [ ]moderate [ x]severe [ ]underweight [ ]morbid obesity  https://www.andeal.org/vault/2440/web/files/ONC/Table_Clinical%20Characteristics%20to%20Document%20Malnutrition-White%20JV%20et%20al%202012.pdf    Height (cm): 167.6 (10-17-21 @ 11:37), 170.2 (10-05-21 @ 17:05), 170.2 (09-26-21 @ 23:37)  Weight (kg): 59.5 (10-17-21 @ 11:37), 56.5 (10-10-21 @ 10:03)  BMI (kg/m2): 21.2 (10-17-21 @ 11:37), 19.5 (10-10-21 @ 10:03)    [x ]PPSV2 < or = 30%  [x ]significant weight loss [ x]poor nutritional intake [ ]anasarca    [ ]Artificial Nutrition    REFERRALS:   [ ]Chaplaincy  [x ]Hospice  [ ]Child Life  [x ]Social Work  [x ]Case management [ ]Holistic Therapy     Goals of Care Document:     SUBJECTIVE AND OBJECTIVE: feeling ok, asking for pizza   INTERVAL HPI/OVERNIGHT EVENTS:     DNR on chart: yes  Allergies    Cheese (Unknown)  Corn (Unknown)  No Known Drug Allergies    Intolerances    MEDICATIONS  (STANDING):  amLODIPine   Tablet 2.5 milliGRAM(s) Oral daily  famotidine    Tablet 20 milliGRAM(s) Oral daily  polyethylene glycol 3350 17 Gram(s) Oral daily  rivaroxaban 15 milliGRAM(s) Oral two times a day with meals  senna 1 Tablet(s) Oral two times a day  tamsulosin 0.4 milliGRAM(s) Oral at bedtime    MEDICATIONS  (PRN):  bisacodyl Suppository 10 milliGRAM(s) Rectal daily PRN Constipation  oxyCODONE    IR 2.5 milliGRAM(s) Oral every 4 hours PRN Moderate Pain (4 - 6)      ITEMS UNCHECKED ARE NOT PRESENT    PRESENT SYMPTOMS: [ ]Unable to obtain due to poor mentation   Source if other than patient:  [ ]Family   [ ]Team     Pain:  [ ]yes [x ]no  QOL impact -   Location -                    Aggravating factors -  Quality -  Radiation -  Timing-  Severity (0-10 scale):  Minimal acceptable level (0-10 scale):     Dyspnea:                           [ ]Mild [ ]Moderate [ ]Severe  Anxiety:                             [ ]Mild [ ]Moderate [ ]Severe  Fatigue:                             [ ]Mild [ ]Moderate [ ]Severe  Nausea:                             [ ]Mild [ ]Moderate [ ]Severe  Loss of appetite:              [ ]Mild [ ]Moderate [ ]Severe  Constipation:                    [ ]Mild [ ]Moderate [ ]Severe    CPOT:    https://www.[facility].org/getattachment/[email or web address]/Critical-Care-Pain-Observation-Tool-(CPOT)    PAIN AD Score:	  http://geriatrictoolkit.[place].[facility]/cog/painad.pdf (Ctrl + left click to view)    Other Symptoms:  [ ]All other review of systems negative     Palliative Performance Status Version 2:         20-30%      http://npcrc.org/files/news/palliative_performance_scale_ppsv2.pdf  PHYSICAL EXAM:  Vital Signs Last 24 Hrs  T(C): 36.7 (19 Oct 2021 05:37), Max: 36.7 (19 Oct 2021 05:37)  T(F): 98.1 (19 Oct 2021 05:37), Max: 98.1 (19 Oct 2021 05:37)  HR: 100 (19 Oct 2021 05:37) (94 - 101)  BP: 120/76 (19 Oct 2021 05:37) (116/74 - 144/80)  BP(mean): --  RR: 18 (19 Oct 2021 05:37) (17 - 18)  SpO2: 96% (19 Oct 2021 05:37) (96% - 98%) I&O's Summary    18 Oct 2021 07:01  -  19 Oct 2021 07:00  --------------------------------------------------------  IN: 0 mL / OUT: 400 mL / NET: -400 mL    19 Oct 2021 07:01  -  19 Oct 2021 11:27  --------------------------------------------------------  IN: 0 mL / OUT: 100 mL / NET: -100 mL       GENERAL:  [ x]Alert  [ x]Oriented x 3  [ ]Lethargic  [ x]Cachexia  [ ]Unarousable  [ x]Verbal  [ ]Non-Verbal  Behavioral:   [ ]Anxiety  [ ]Delirium [ ]Agitation [ ]Other  HEENT:  [ ]Normal   [x ]Dry mouth   [ ]ET Tube/Trach  [ ]Oral lesions  PULMONARY:   [x ]Clear [ ]Tachypnea  [ ]Audible excessive secretions   [ ]Rhonchi        [ ]Right [ ]Left [ ]Bilateral  [ ]Crackles        [ ]Right [ ]Left [ ]Bilateral  [ ]Wheezing     [ ]Right [ ]Left [ ]Bilateral  [ ]Diminished BS [ ] Right [ ]Left [ ]Bilateral  CARDIOVASCULAR:    [ ]Regular [ ]Irregular [ x]Tachy  [ ]Reuben [ ]Murmur [ ]Other  GASTROINTESTINAL:  [ x]Soft  [ ]Distended   [x ]+BS  [ x]Non tender [ ]Tender  [ ]PEG [ ]OGT/ NGT   Last BM:  10/19  GENITOURINARY:  [x ]Normal [ ]Incontinent   [ ]Oliguria/Anuria   [ ]Payan  MUSCULOSKELETAL:   [ ]Normal   [ x]Weakness  [ ]Bed/Wheelchair bound [ ]Edema  NEUROLOGIC:   [ ]No focal deficits  [x ] Cognitive impairment  [ ] Dysphagia [ ]Dysarthria [ ] Paresis [ ]Other   SKIN:   [ ]Normal  [ ]Rash   [ x]Pressure ulcer(s) stage I sacrum  [x ]y [ ]n present on admission    CRITICAL CARE:  [ ]Shock Present  [ ]Septic [ ]Cardiogenic [ ]Neurologic [ ]Hypovolemic  [ ]Vasopressors [ ]Inotropes  [ ]Respiratory failure present [ ]Mechanical Ventilation [ ]Non-invasive ventilatory support [ ]High-Flow   [ ]Acute  [ ]Chronic [ ]Hypoxic  [ ]Hypercarbic [ ]Other  [ ]Other organ failure     LABS:                        9.7    5.81  )-----------( 403      ( 19 Oct 2021 07:19 )             29.6   10-19    141  |  110<H>  |  26<H>  ----------------------------<  86  4.0   |  23  |  0.80    Ca    8.1<L>      19 Oct 2021 07:19  Phos  2.7     10-19  Mg     2.4     10-19    TPro  4.9<L>  /  Alb  1.4<L>  /  TBili  1.0  /  DBili  x   /  AST  18  /  ALT  19  /  AlkPhos  1325<H>  10-18  PT/INR - ( 18 Oct 2021 07:19 )   PT: 25.3 sec;   INR: 2.27 ratio         PTT - ( 18 Oct 2021 07:19 )  PTT:36.0 sec      RADIOLOGY & ADDITIONAL STUDIES:     Protein Calorie Malnutrition Present: [ ]mild [ ]moderate [ x]severe [ ]underweight [ ]morbid obesity  https://www.andeal.org/vault/2440/web/files/ONC/Table_Clinical%20Characteristics%20to%20Document%20Malnutrition-White%20JV%20et%20al%202012.pdf    Height (cm): 167.6 (10-17-21 @ 11:37), 170.2 (10-05-21 @ 17:05), 170.2 (09-26-21 @ 23:37)  Weight (kg): 59.5 (10-17-21 @ 11:37), 56.5 (10-10-21 @ 10:03)  BMI (kg/m2): 21.2 (10-17-21 @ 11:37), 19.5 (10-10-21 @ 10:03)    [x ]PPSV2 < or = 30%  [x ]significant weight loss [ x]poor nutritional intake [ ]anasarca    [ ]Artificial Nutrition    REFERRALS:   [ ]Chaplaincy  [x ]Hospice  [ ]Child Life  [x ]Social Work  [x ]Case management [ ]Holistic Therapy     Goals of Care Document:

## 2021-10-19 NOTE — PROGRESS NOTE ADULT - ASSESSMENT
89yoM  w PMH of LBP s/p L hip arthroplasty (ab 13 years ago), unspecified gallbadder disease s/p biliary stent placement in 2018 [facility], CAD, HTN, BPH who is admitted from [facility] for shortness of breath found to have PE, DVT. Palliative Consulted for GOC, symptom management.

## 2021-10-19 NOTE — PROGRESS NOTE ADULT - SUBJECTIVE AND OBJECTIVE BOX
89 yo M with a history of CAD, HTN, HLD, BPH, gallbladder disease s/p biliary stent in 2018, metastatic prostate cancer who was sent to the ED from [facility] secondary to dyspnea after getting 2 units of PRBCs and was found to have bilateral pulmonary emboli & bilateral DVTs. He is lying in bed in NAD.       MEDICATIONS  (STANDING):  amLODIPine   Tablet 2.5 milliGRAM(s) Oral daily  famotidine    Tablet 20 milliGRAM(s) Oral daily  rivaroxaban 15 milliGRAM(s) Oral two times a day with meals  tamsulosin 0.4 milliGRAM(s) Oral at bedtime    MEDICATIONS  (PRN):  bisacodyl Suppository 10 milliGRAM(s) Rectal daily PRN Constipation  oxyCODONE    IR 2.5 milliGRAM(s) Oral every 4 hours PRN Moderate Pain (4 - 6)      Allergies    Cheese (Unknown)  Corn (Unknown)  No Known Drug Allergies    Intolerances        Vital Signs Last 24 Hrs  T(C): 36.8 (19 Oct 2021 15:30), Max: 36.8 (19 Oct 2021 15:30)  T(F): 98.3 (19 Oct 2021 15:30), Max: 98.3 (19 Oct 2021 15:30)  HR: 101 (19 Oct 2021 15:30) (94 - 101)  BP: 138/84 (19 Oct 2021 15:30) (120/76 - 144/80)  BP(mean): --  RR: 18 (19 Oct 2021 15:30) (18 - 18)  SpO2: 100% (19 Oct 2021 15:30) (96% - 100%)    PHYSICAL EXAM:  GENERAL: NAD, well-groomed, well-developed  HEAD:  Atraumatic, Normocephalic  EYES: EOMI, PERRLA   NECK: Supple   NERVOUS SYSTEM:  Alert    CHEST/LUNG: Clear to auscultation bilaterally; No rales, rhonchi, wheezing, or rubs  HEART: Regular rate and rhythm; No murmurs, rubs, or gallops  ABDOMEN: Soft, Nontender, Nondistended; Bowel sounds present  EXTREMITIES:  No clubbing, cyanosis, or edema      LABS:                        9.7    5.81  )-----------( 403      ( 19 Oct 2021 07:19 )             29.6     10-19    141  |  110<H>  |  26<H>  ----------------------------<  86  4.0   |  23  |  0.80    Ca    8.1<L>      19 Oct 2021 07:19  Phos  2.7     10-19  Mg     2.4     10-19    TPro  4.9<L>  /  Alb  1.4<L>  /  TBili  1.0  /  DBili  x   /  AST  18  /  ALT  19  /  AlkPhos  1325<H>  10-18    PT/INR - ( 18 Oct 2021 07:19 )   PT: 25.3 sec;   INR: 2.27 ratio         PTT - ( 18 Oct 2021 07:19 )  PTT:36.0 sec    CAPILLARY BLOOD GLUCOSE          RADIOLOGY & ADDITIONAL TESTS:    10-18-21 @ 07:01  -  10-19-21 @ 07:00  --------------------------------------------------------  IN:  Total IN: 0 mL    OUT:    Voided (mL): 400 mL  Total OUT: 400 mL    Total NET: -400 mL      10-19-21 @ 07:01  -  10-19-21 @ 22:45  --------------------------------------------------------  IN:  Total IN: 0 mL    OUT:    Voided (mL): 200 mL  Total OUT: 200 mL    Total NET: -200 mL

## 2021-10-20 LAB
ANION GAP SERPL CALC-SCNC: 7 MMOL/L — SIGNIFICANT CHANGE UP (ref 5–17)
BUN SERPL-MCNC: 24 MG/DL — HIGH (ref 7–23)
CALCIUM SERPL-MCNC: 8.2 MG/DL — LOW (ref 8.5–10.1)
CHLORIDE SERPL-SCNC: 109 MMOL/L — HIGH (ref 96–108)
CO2 SERPL-SCNC: 26 MMOL/L — SIGNIFICANT CHANGE UP (ref 22–31)
CREAT SERPL-MCNC: 0.84 MG/DL — SIGNIFICANT CHANGE UP (ref 0.5–1.3)
GLUCOSE SERPL-MCNC: 88 MG/DL — SIGNIFICANT CHANGE UP (ref 70–99)
HCT VFR BLD CALC: 32.2 % — LOW (ref 39–50)
HGB BLD-MCNC: 10.5 G/DL — LOW (ref 13–17)
MAGNESIUM SERPL-MCNC: 2.3 MG/DL — SIGNIFICANT CHANGE UP (ref 1.6–2.6)
MCHC RBC-ENTMCNC: 31.5 PG — SIGNIFICANT CHANGE UP (ref 27–34)
MCHC RBC-ENTMCNC: 32.6 GM/DL — SIGNIFICANT CHANGE UP (ref 32–36)
MCV RBC AUTO: 96.7 FL — SIGNIFICANT CHANGE UP (ref 80–100)
NRBC # BLD: 0 /100 WBCS — SIGNIFICANT CHANGE UP (ref 0–0)
PHOSPHATE SERPL-MCNC: 2.9 MG/DL — SIGNIFICANT CHANGE UP (ref 2.5–4.5)
PLATELET # BLD AUTO: 446 K/UL — HIGH (ref 150–400)
POTASSIUM SERPL-MCNC: 4.4 MMOL/L — SIGNIFICANT CHANGE UP (ref 3.5–5.3)
POTASSIUM SERPL-SCNC: 4.4 MMOL/L — SIGNIFICANT CHANGE UP (ref 3.5–5.3)
RAPID RVP RESULT: SIGNIFICANT CHANGE UP
RBC # BLD: 3.33 M/UL — LOW (ref 4.2–5.8)
RBC # FLD: 19.9 % — HIGH (ref 10.3–14.5)
SARS-COV-2 RNA SPEC QL NAA+PROBE: SIGNIFICANT CHANGE UP
SODIUM SERPL-SCNC: 142 MMOL/L — SIGNIFICANT CHANGE UP (ref 135–145)
WBC # BLD: 6.24 K/UL — SIGNIFICANT CHANGE UP (ref 3.8–10.5)
WBC # FLD AUTO: 6.24 K/UL — SIGNIFICANT CHANGE UP (ref 3.8–10.5)

## 2021-10-20 PROCEDURE — 99233 SBSQ HOSP IP/OBS HIGH 50: CPT

## 2021-10-20 RX ORDER — FAMOTIDINE 10 MG/ML
0 INJECTION INTRAVENOUS
Qty: 0 | Refills: 0 | DISCHARGE

## 2021-10-20 RX ORDER — OXYCODONE HYDROCHLORIDE 5 MG/1
0.5 TABLET ORAL
Qty: 0 | Refills: 0 | DISCHARGE

## 2021-10-20 RX ORDER — TAMSULOSIN HYDROCHLORIDE 0.4 MG/1
1 CAPSULE ORAL
Qty: 0 | Refills: 0 | DISCHARGE

## 2021-10-20 RX ORDER — PSYLLIUM SEED (WITH DEXTROSE)
0 POWDER (GRAM) ORAL
Qty: 0 | Refills: 0 | DISCHARGE

## 2021-10-20 RX ORDER — CHOLESTYRAMINE 4 G/9G
1 POWDER, FOR SUSPENSION ORAL
Qty: 0 | Refills: 0 | DISCHARGE

## 2021-10-20 RX ORDER — MICONAZOLE NITRATE 2 %
1 CREAM (GRAM) TOPICAL
Qty: 0 | Refills: 0 | DISCHARGE

## 2021-10-20 RX ORDER — AMLODIPINE BESYLATE 2.5 MG/1
1 TABLET ORAL
Qty: 0 | Refills: 0 | DISCHARGE

## 2021-10-20 RX ADMIN — OXYCODONE HYDROCHLORIDE 2.5 MILLIGRAM(S): 5 TABLET ORAL at 22:53

## 2021-10-20 RX ADMIN — TAMSULOSIN HYDROCHLORIDE 0.4 MILLIGRAM(S): 0.4 CAPSULE ORAL at 21:49

## 2021-10-20 RX ADMIN — AMLODIPINE BESYLATE 2.5 MILLIGRAM(S): 2.5 TABLET ORAL at 05:34

## 2021-10-20 RX ADMIN — FAMOTIDINE 20 MILLIGRAM(S): 10 INJECTION INTRAVENOUS at 12:18

## 2021-10-20 RX ADMIN — OXYCODONE HYDROCHLORIDE 2.5 MILLIGRAM(S): 5 TABLET ORAL at 23:23

## 2021-10-20 RX ADMIN — RIVAROXABAN 15 MILLIGRAM(S): KIT at 18:23

## 2021-10-20 RX ADMIN — RIVAROXABAN 15 MILLIGRAM(S): KIT at 09:28

## 2021-10-20 NOTE — DIETITIAN INITIAL EVALUATION ADULT. - OTHER INFO
Unable to converse c pt due to severely [place]; also pt lethargic & is sleeping most of the time.  Per discussion c nutrition ambassador yesterday & palliative care MD pt/daughter request cold cereal for breakfast ([employer], corn flakes - no raisins); for lunch & dinner pureed consistency; pt does not like liquid supplements but accepted Ensure Pudding.  Daughter also brings in food from outside. Per MOLST form pt DNR; no tube feeding accepted.  Pt from [facility] diet there was DASH, ground consistency. No reports of any N/V/D; pt on bowel regimen for constipation; no swallowing difficulty; chewing difficult due to poor dentition.

## 2021-10-20 NOTE — DIETITIAN INITIAL EVALUATION ADULT. - NUTRITIONGOAL OUTCOME1
Provide nutrition/hydration as medically appropriate, as tolerated, within pt/family's wishes for tx

## 2021-10-20 NOTE — DIETITIAN INITIAL EVALUATION ADULT. - OTHER CALCULATIONS
Ht (cm): 167.6    Wt (kg): 59 (10/20)   BMI:    20.9   IBW:  64.5 kg  %IBW: 91%  UBW:  unknown  %UBW: unknown

## 2021-10-20 NOTE — DIETITIAN INITIAL EVALUATION ADULT. - CALCULATED FROM (CAL/KG)
SEVERITY:- OTHERWISE NORMAL ECG -

SINUS TACHYCARDIA

:

Confirmed by: Ricardo Christianson MD 13-Feb-2020 23:15:01 1935

## 2021-10-20 NOTE — PROGRESS NOTE ADULT - ASSESSMENT
89 y/o M with a history of CAD, HTN, HLD, BPH, gallbladder disease s/p biliary stent in 2018, metastatic prostate cancer who was sent to the ED from [facility] secondary to dyspnea after getting 2 units of PRBCs and was found to have bilateral pulmonary emboli & bilateral DVTs.    B/l pulmonary embolism and bilateral DVTs  - CT chest showed pulmonary emboli in the right upper lobar pulmonary artery extending into segmental and subsegmental branches, right middle lobar and right lower lobar segmental and subsegmental branches, and a few left upper lobar and lower lobar segmental and subsegmental branches w/ a moderate left and small right pleural effusion with associated compressive atelectasis  - LE US showed acute partially occlusive thrombus in the left common femoral and femoral veins as well as the right femoral vein  - I spoke w/ his daughter and offered coumadin vs DOAC. She decided on DAOC and he has been started on Xarelto  - wean off oxygen    Pancreas cancer  - CT showed the known pancreatic head mass, which was poorly visualized, as well as, scattered low-attenuation lesions in the liver concerning for metastatic disease w/ a small volume of ascites  - c/w oxycodone prn  - daughter stated that she would like for the patient to ultimately go home on home hospice    GERD  - CT showed Mild thickening of the mid to distal esophageal walls concerning for esophagitis  - c/w Pepcid    Essential hypertension  - c/w amlodipine    BPH without urinary obstruction  - c/w tamsulosin    Constipation  - c/w Miralax, senna w/ PRN dulcolax     Prophylaxis:  DVT: Xarelto  GI: Pepcid    Code Status: DNR    Dispo: rehab     I called and spoke w/ his daughter, Anna. She would like to be called every day with an update. She also noted that she doesn't want the pt's code status to be discussed with him.

## 2021-10-20 NOTE — PROGRESS NOTE ADULT - SUBJECTIVE AND OBJECTIVE BOX
Patient is a 89y old  Male who presents with a chief complaint of PE (20 Oct 2021 10:16)    INTERVAL HPI/OVERNIGHT EVENTS: No acute events overnight. Reports would like to go home. Right leg pain controlled on current regimen.    MEDICATIONS  (STANDING):  amLODIPine   Tablet 2.5 milliGRAM(s) Oral daily  famotidine    Tablet 20 milliGRAM(s) Oral daily  rivaroxaban 15 milliGRAM(s) Oral two times a day with meals  tamsulosin 0.4 milliGRAM(s) Oral at bedtime    MEDICATIONS  (PRN):  bisacodyl Suppository 10 milliGRAM(s) Rectal daily PRN Constipation  oxyCODONE    IR 2.5 milliGRAM(s) Oral every 4 hours PRN Moderate Pain (4 - 6)    Allergies    Cheese (Unknown)  Corn (Unknown)  No Known Drug Allergies    Intolerances      REVIEW OF SYSTEMS:  All other systems reviewed and are negative    Vital Signs Last 24 Hrs  T(C): 36.9 (20 Oct 2021 10:28), Max: 36.9 (20 Oct 2021 01:01)  T(F): 98.4 (20 Oct 2021 10:28), Max: 98.5 (20 Oct 2021 01:01)  HR: 102 (20 Oct 2021 10:28) (99 - 102)  BP: 132/87 (20 Oct 2021 10:28) (117/72 - 132/87)  BP(mean): --  RR: 18 (20 Oct 2021 10:28) (18 - 19)  SpO2: 96% (20 Oct 2021 10:28) (96% - 99%)  Daily     Daily Weight in k (20 Oct 2021 05:09)  I&O's Summary    19 Oct 2021 07:01  -  20 Oct 2021 07:00  --------------------------------------------------------  IN: 0 mL / OUT: 200 mL / NET: -200 mL      CAPILLARY BLOOD GLUCOSE      PHYSICAL EXAM:  GENERAL: NAD, well-groomed, well-developed  HEAD:  Atraumatic, Normocephalic  EYES: EOMI, PERRLA   NECK: Supple   NERVOUS SYSTEM:  Alert    CHEST/LUNG: Clear to auscultation bilaterally; No rales, rhonchi, wheezing, or rubs  HEART: Regular rate and rhythm; No murmurs, rubs, or gallops  ABDOMEN: Soft, Nontender, Nondistended; Bowel sounds present  EXTREMITIES:  No clubbing, cyanosis, or edema    Labs                          10.5   6.24  )-----------( 446      ( 20 Oct 2021 07:31 )             32.2     10-20    142  |  109<H>  |  24<H>  ----------------------------<  88  4.4   |  26  |  0.84    Ca    8.2<L>      20 Oct 2021 07:31  Phos  2.9     10-20  Mg     2.3     10-20

## 2021-10-20 NOTE — DIETITIAN INITIAL EVALUATION ADULT. - PERTINENT LABORATORY DATA
10-20 Na142 mmol/L Glu 88 mg/dL K+ 4.4 mmol/L Cr  0.84 mg/dL BUN 24 mg/dL<H> 10-20 Phos 2.9 mg/dL 10-18 Alb 1.4 g/dL<L>

## 2021-10-20 NOTE — DIETITIAN NUTRITION RISK NOTIFICATION - TREATMENT: THE FOLLOWING DIET HAS BEEN RECOMMENDED
Diet, Pureed:   Supplement Feeding Modality:  Oral  Ensure Pudding Cans or Servings Per Day:  1       Frequency:  Three Times a day (10-19-21 @ 10:18) [Pending Verification By Attending]  Diet, Pureed:   Supplement Feeding Modality:  Oral  Ensure Enlive Cans or Servings Per Day:  1       Frequency:  Three Times a day (10-19-21 @ 08:49) [Pending Verification By Attending]  Diet, Pureed (10-18-21 @ 20:03) [Active]

## 2021-10-21 PROCEDURE — 99233 SBSQ HOSP IP/OBS HIGH 50: CPT

## 2021-10-21 RX ORDER — OXYCODONE HYDROCHLORIDE 5 MG/1
2.5 TABLET ORAL EVERY 4 HOURS
Refills: 0 | Status: DISCONTINUED | OUTPATIENT
Start: 2021-10-21 | End: 2021-10-22

## 2021-10-21 RX ADMIN — AMLODIPINE BESYLATE 2.5 MILLIGRAM(S): 2.5 TABLET ORAL at 05:41

## 2021-10-21 RX ADMIN — OXYCODONE HYDROCHLORIDE 2.5 MILLIGRAM(S): 5 TABLET ORAL at 18:08

## 2021-10-21 RX ADMIN — RIVAROXABAN 15 MILLIGRAM(S): KIT at 12:16

## 2021-10-21 RX ADMIN — RIVAROXABAN 15 MILLIGRAM(S): KIT at 17:38

## 2021-10-21 RX ADMIN — OXYCODONE HYDROCHLORIDE 2.5 MILLIGRAM(S): 5 TABLET ORAL at 17:38

## 2021-10-21 RX ADMIN — TAMSULOSIN HYDROCHLORIDE 0.4 MILLIGRAM(S): 0.4 CAPSULE ORAL at 22:51

## 2021-10-21 RX ADMIN — FAMOTIDINE 20 MILLIGRAM(S): 10 INJECTION INTRAVENOUS at 12:16

## 2021-10-21 NOTE — PROGRESS NOTE ADULT - SUBJECTIVE AND OBJECTIVE BOX
Patient is a 89y old  Male who presents with a chief complaint of PE (21 Oct 2021 10:34)    INTERVAL HPI/OVERNIGHT EVENTS: No acute overnight events. Tolerating breakfast this morning.     MEDICATIONS  (STANDING):  amLODIPine   Tablet 2.5 milliGRAM(s) Oral daily  famotidine    Tablet 20 milliGRAM(s) Oral daily  rivaroxaban 15 milliGRAM(s) Oral two times a day with meals  tamsulosin 0.4 milliGRAM(s) Oral at bedtime    MEDICATIONS  (PRN):  bisacodyl Suppository 10 milliGRAM(s) Rectal daily PRN Constipation  oxyCODONE    IR 2.5 milliGRAM(s) Oral every 4 hours PRN Moderate Pain (4 - 6)    Allergies    Cheese (Unknown)  Corn (Unknown)  No Known Drug Allergies    Intolerances      REVIEW OF SYSTEMS:  All other systems reviewed and are negative    Vital Signs Last 24 Hrs  T(C): 36.3 (21 Oct 2021 10:50), Max: 36.4 (20 Oct 2021 16:31)  T(F): 97.4 (21 Oct 2021 10:50), Max: 97.6 (20 Oct 2021 16:31)  HR: 100 (21 Oct 2021 10:50) (94 - 109)  BP: 108/71 (21 Oct 2021 10:50) (108/71 - 155/90)  BP(mean): --  RR: 18 (21 Oct 2021 10:50) (18 - 18)  SpO2: 97% (21 Oct 2021 10:50) (96% - 97%)  Daily     Daily   I&O's Summary    20 Oct 2021 07:01  -  21 Oct 2021 07:00  --------------------------------------------------------  IN: 218 mL / OUT: 200 mL / NET: 18 mL      CAPILLARY BLOOD GLUCOSE        PHYSICAL EXAM:  GENERAL: NAD, well-groomed, well-developed  HEAD:  Atraumatic, Normocephalic  EYES: EOMI, PERRLA, conjunctiva and sclera clear  ENMT: No tonsillar erythema, exudates, or enlargement; Moist mucous membranes, Good dentition, No lesions  NECK: Supple, No JVD, Normal thyroid  NERVOUS SYSTEM:  Alert & Oriented X3, Good concentration; Motor Strength 5/5 B/L upper and lower extremities; DTRs 2+ intact and symmetric  CHEST/LUNG: Clear to percussion bilaterally; No rales, rhonchi, wheezing, or rubs  HEART: Regular rate and rhythm; No murmurs, rubs, or gallops  ABDOMEN: Soft, Nontender, Nondistended; Bowel sounds present  EXTREMITIES:  2+ Peripheral Pulses, No clubbing, cyanosis, or edema  LYMPH: No lymphadenopathy noted  SKIN: No rashes or lesions. scattered ecchymosis on left arm and right hand.     Labs                          10.5   6.24  )-----------( 446      ( 20 Oct 2021 07:31 )             32.2     10-20    142  |  109<H>  |  24<H>  ----------------------------<  88  4.4   |  26  |  0.84    Ca    8.2<L>      20 Oct 2021 07:31  Phos  2.9     10-20  Mg     2.3     10-20

## 2021-10-21 NOTE — PROGRESS NOTE ADULT - SUBJECTIVE AND OBJECTIVE BOX
SUBJECTIVE AND OBJECTIVE: pain overnight per patient  INTERVAL HPI/OVERNIGHT EVENTS:    DNR on chart: yes  Allergies    Cheese (Unknown)  Corn (Unknown)  No Known Drug Allergies    Intolerances    MEDICATIONS  (STANDING):  amLODIPine   Tablet 2.5 milliGRAM(s) Oral daily  famotidine    Tablet 20 milliGRAM(s) Oral daily  rivaroxaban 15 milliGRAM(s) Oral two times a day with meals  tamsulosin 0.4 milliGRAM(s) Oral at bedtime    MEDICATIONS  (PRN):  bisacodyl Suppository 10 milliGRAM(s) Rectal daily PRN Constipation  oxyCODONE    IR 2.5 milliGRAM(s) Oral every 4 hours PRN Moderate Pain (4 - 6)      ITEMS UNCHECKED ARE NOT PRESENT    PRESENT SYMPTOMS: [ ]Unable to obtain due to poor mentation   Source if other than patient:  [ ]Family   [ ]Team     Pain:  [ x]yes [ ]no  QOL impact - bothersome  Location -       back             Aggravating factors - none identified  Quality -sharp to ache  Radiation - sometimes around to chest   Timing- comes and goes lasts longer than hour  Severity (0-10 scale): 6-8 right now is 5  Minimal acceptable level (0-10 scale): 0 preferred     Dyspnea:                           [x ]Mild [ ]Moderate [ ]Severe  Anxiety:                             [ ]Mild [ ]Moderate [ ]Severe  Fatigue:                             [ ]Mild [ ]Moderate [ ]Severe  Nausea:                             [ ]Mild [ ]Moderate [ ]Severe  Loss of appetite:              [ ]Mild [x ]Moderate [ ]Severe  Constipation:                    [ ]Mild [ ]Moderate [ ]Severe    CPOT:    https://www.[facility].org/getattachment/[email or web address]/Critical-Care-Pain-Observation-Tool-(CPOT)    PAIN AD Score:	  http://geriatrictoolkit.[place].[facility]/cog/painad.pdf (Ctrl + left click to view)    Other Symptoms:  [x]All other review of systems negative     Palliative Performance Status Version 2:      30 %      http://npcrc.org/files/news/palliative_performance_scale_ppsv2.pdf  PHYSICAL EXAM:  Vital Signs Last 24 Hrs  T(C): 36.4 (21 Oct 2021 05:53), Max: 36.4 (20 Oct 2021 16:31)  T(F): 97.5 (21 Oct 2021 05:53), Max: 97.6 (20 Oct 2021 16:31)  HR: 94 (21 Oct 2021 05:53) (94 - 109)  BP: 155/90 (21 Oct 2021 05:53) (117/77 - 155/90)  BP(mean): --  RR: 18 (21 Oct 2021 05:53) (18 - 18)  SpO2: 96% (21 Oct 2021 05:53) (96% - 96%) I&O's Summary    20 Oct 2021 07:01  -  21 Oct 2021 07:00  --------------------------------------------------------  IN: 218 mL / OUT: 200 mL / NET: 18 mL       GENERAL:  [x ]Alert  [x ]Oriented x 3  [ ]Lethargic  [ x]Cachexia  [ ]Unarousable  [ x]Verbal  [ ]Non-Verbal  Behavioral:   [ ]Anxiety  [ ]Delirium [ ]Agitation [ ]Other  HEENT:  [ ]Normal   [x ]Dry mouth   [ ]ET Tube/Trach  [ ]Oral lesions  PULMONARY:   [ x]Clear [ ]Tachypnea  [ ]Audible excessive secretions   [ ]Rhonchi        [ ]Right [ ]Left [ ]Bilateral  [ ]Crackles        [ ]Right [ ]Left [ ]Bilateral  [ ]Wheezing     [ ]Right [ ]Left [ ]Bilateral  [ ]Diminished BS [ ] Right [ ]Left [ ]Bilateral  CARDIOVASCULAR:    [x ]Regular [ ]Irregular [ ]Tachy  [ ]Reuben [ ]Murmur [ ]Other  GASTROINTESTINAL:  [ x]Soft  [ ]Distended   [ x]+BS  [ x]Non tender [ ]Tender  [ ]PEG [ ]OGT/ NGT   Last BM:  10/20  GENITOURINARY:  [x ]Normal [ ]Incontinent   [ ]Oliguria/Anuria   [ ]Payan  MUSCULOSKELETAL:   [ ]Normal   [ ]Weakness  [x ]Bed/Wheelchair bound [ ]Edema  NEUROLOGIC:   [ ]No focal deficits  [x ] Cognitive impairment  [ ] Dysphagia [ ]Dysarthria [ ] Paresis [ ]Other   SKIN:   [ ]Normal  [ ]Rash   [x ]Pressure ulcer(s)stage I sacrum  [x ]y [ ]n present on admission    CRITICAL CARE:  [ ]Shock Present  [ ]Septic [ ]Cardiogenic [ ]Neurologic [ ]Hypovolemic  [ ]Vasopressors [ ]Inotropes  [ ]Respiratory failure present [ ]Mechanical Ventilation [ ]Non-invasive ventilatory support [ ]High-Flow   [ ]Acute  [ ]Chronic [ ]Hypoxic  [ ]Hypercarbic [ ]Other  [ ]Other organ failure     LABS:                        10.5   6.24  )-----------( 446      ( 20 Oct 2021 07:31 )             32.2   10-20    142  |  109<H>  |  24<H>  ----------------------------<  88  4.4   |  26  |  0.84    Ca    8.2<L>      20 Oct 2021 07:31  Phos  2.9     10-20  Mg     2.3     10-20          RADIOLOGY & ADDITIONAL STUDIES:    Protein Calorie Malnutrition Present: [ ]mild [x ]moderate [ ]severe [ ]underweight [ ]morbid obesity  https://www.andeal.org/vault/2440/web/files/ONC/Table_Clinical%20Characteristics%20to%20Document%20Malnutrition-White%20JV%20et%20al%839914.pdf    Height (cm): 167.6 (10-17-21 @ 11:37), 170.2 (10-05-21 @ 17:05), 170.2 (09-26-21 @ 23:37)  Weight (kg): 59.5 (10-17-21 @ 11:37), 56.5 (10-10-21 @ 10:03)  BMI (kg/m2): 21.2 (10-17-21 @ 11:37), 19.5 (10-10-21 @ 10:03)    [xPPSV2 < or = 30%  [x ]significant weight loss [x ]poor nutritional intake [ ]anasarca    [ ]Artificial Nutrition    REFERRALS:   [ ]Chaplaincy  [ ]Hospice  [ ]Child Life  [x ]Social Work  [ ]Case management [ ]Holistic Therapy     Goals of Care Document:

## 2021-10-21 NOTE — PROGRESS NOTE ADULT - PROBLEM SELECTOR PLAN 8
continue flomax  monitor for retention.  voiding in urinal
continue flomax  monitor for retention.  voiding in urinal

## 2021-10-21 NOTE — PROGRESS NOTE ADULT - PROBLEM SELECTOR PROBLEM 4
BPH without urinary obstruction
BPH without urinary obstruction
Adult failure to thrive
Adult failure to thrive

## 2021-10-21 NOTE — PROGRESS NOTE ADULT - PROBLEM SELECTOR PLAN 9
spoke with daughter Anna the other day, she still wants him to return to [facility] to see if he can get some mobility back and if not will then decide if he would be best served being placed or coming home with hospice and try to use his  status to assist with services.   D/w Dr. Leslie
spoke with daughter Anna via phone today, answered her questions regarding diet and weight. I counseled her that at this point, to perhaps not focus on the numbers but the overall picture. I advised her she could bring him food of his liking and I would ask the dietician to help with food choices as pt eats cold cereal for breakfast but remainder of  meals need to be pureed. Anna still wants him to return to [facility] to see if he can get some mobility back and if not will then decide if he would be best served being placed or coming home with hospice and try to utlilize his  status to assist with services.   D/w Dr. Whitmore and SAGAR Jordan

## 2021-10-21 NOTE — PROGRESS NOTE ADULT - PROBLEM SELECTOR PLAN 1
pt comfortable on nasal oxygen   does have some dyspnea at times
pt comfortable on nasal oxygen   asking when he can go home

## 2021-10-21 NOTE — PROGRESS NOTE ADULT - PROBLEM SELECTOR PLAN 5
on eliquis   would consider high risk of bleeding with advanced age, malignancy   risk benefit ratio should be carefully weighed here.
on eliquis   would consider high risk of bleeding with advanced age, malignancy   risk benefit ratio should be carefully weighed here.

## 2021-10-21 NOTE — PROGRESS NOTE ADULT - PROBLEM SELECTOR PLAN 2
periodic pain in back and epigastric area   reported pain overnight and stated he had to wait a long time to get the medicine and gain relief
periodic pain in back and epigastric area   no report of pain today

## 2021-10-21 NOTE — PROGRESS NOTE ADULT - NUTRITIONAL ASSESSMENT
This patient has been assessed with a concern for Malnutrition and has been determined to have a diagnosis/diagnoses of Severe protein-calorie malnutrition.    This patient is being managed with:   Diet Pureed-  Supplement Feeding Modality:  Oral  Ensure Pudding Cans or Servings Per Day:  1       Frequency:  Three Times a day  Entered: Oct 19 2021 10:18AM    Diet Pureed-  Entered: Oct 18 2021  8:03PM    The following pending diet order is being considered for treatment of Severe protein-calorie malnutrition:null
This patient has been assessed with a concern for Malnutrition and has been determined to have a diagnosis/diagnoses of Severe protein-calorie malnutrition.    This patient is being managed with:   Diet Pureed-  Supplement Feeding Modality:  Oral  Ensure Pudding Cans or Servings Per Day:  1       Frequency:  Three Times a day  Entered: Oct 19 2021 10:18AM    Diet Pureed-  Entered: Oct 18 2021  8:03PM    The following pending diet order is being considered for treatment of Severe protein-calorie malnutrition

## 2021-10-21 NOTE — PROGRESS NOTE ADULT - PROBLEM SELECTOR PLAN 6
s/p biliary stent in 2018  not a candidate for DMT  likely cause of pain and failure to thrive/appetite loss
s/p biliary stent in 2018  not a candidate for DMT  likely cause of pain and failure to thrive/appetite loss

## 2021-10-21 NOTE — PROGRESS NOTE ADULT - PROBLEM SELECTOR PLAN 3
poor intake   advanced age and abdominal malignancy   daughter asking to bring in food he likes - has cold cereal for breakfast every day
poor intake   advanced age and abdominal malignancy   daughter asking to bring in food he likes - will bring him pizza tonight

## 2021-10-21 NOTE — PROGRESS NOTE ADULT - TIME BILLING
evaluation of pt, review of records, collaboration with care teams and family
evaluation of pt, review of records, collaboration with care teams and family

## 2021-10-21 NOTE — PROGRESS NOTE ADULT - ASSESSMENT
89 y/o M with a history of CAD, HTN, HLD, BPH, gallbladder disease s/p biliary stent in 2018, metastatic prostate cancer who was sent to the ED from [facility] secondary to dyspnea after getting 2 units of PRBCs and was found to have bilateral pulmonary emboli & bilateral DVTs currently stable pending rehab placement.    B/l pulmonary embolism and bilateral DVTs  - CT chest showed pulmonary emboli in the right upper lobar pulmonary artery extending into segmental and subsegmental branches, right middle lobar and right lower lobar segmental and subsegmental branches, and a few left upper lobar and lower lobar segmental and subsegmental branches w/ a moderate left and small right pleural effusion with associated compressive atelectasis  - LE US showed acute partially occlusive thrombus in the left common femoral and femoral veins as well as the right femoral vein  - I spoke w/ his daughter and offered coumadin vs DOAC. She decided on DAOC and he has been started on Xarelto  - wean off oxygen    Pancreas cancer  - CT showed the known pancreatic head mass, which was poorly visualized, as well as, scattered low-attenuation lesions in the liver concerning for metastatic disease w/ a small volume of ascites  - c/w oxycodone prn  - daughter stated that she would like for the patient to ultimately go home on home hospice    GERD  - CT showed Mild thickening of the mid to distal esophageal walls concerning for esophagitis  - c/w Pepcid    Essential hypertension  - c/w amlodipine    BPH without urinary obstruction  - c/w tamsulosin    Constipation  - c/w Miralax, senna w/ PRN dulcolax     Prophylaxis:  DVT: Xarelto  GI: Pepcid    Code Status: DNR    Dispo: rehab     I called and spoke w/ his daughter, Anna. She would like to be called every day with an update. She also noted that she doesn't want the pt's code status to be discussed with him.

## 2021-10-21 NOTE — PROGRESS NOTE ADULT - ASSESSMENT
89yoM  w PMH of LBP s/p L hip arthroplasty (ab 13 years ago), unspecified gallbadder disease s/p [facility], CAD, HTN, BPH who is admitted from [facility] for shortness of breath found to have PE, DVT. Palliative Consulted for GOC, symptom management.

## 2021-10-21 NOTE — PROGRESS NOTE ADULT - PROBLEM SELECTOR PROBLEM 8
BPH without urinary obstruction
Encounter for palliative care
BPH without urinary obstruction
Encounter for palliative care

## 2021-10-22 ENCOUNTER — TRANSCRIPTION ENCOUNTER (OUTPATIENT)
Age: 86
End: 2021-10-22

## 2021-10-22 VITALS
DIASTOLIC BLOOD PRESSURE: 81 MMHG | SYSTOLIC BLOOD PRESSURE: 127 MMHG | HEART RATE: 102 BPM | TEMPERATURE: 98 F | OXYGEN SATURATION: 96 % | RESPIRATION RATE: 17 BRPM

## 2021-10-22 PROCEDURE — 99239 HOSP IP/OBS DSCHRG MGMT >30: CPT

## 2021-10-22 RX ORDER — RIVAROXABAN 15 MG-20MG
1 KIT ORAL
Qty: 0 | Refills: 0 | DISCHARGE
Start: 2021-10-22

## 2021-10-22 RX ADMIN — FAMOTIDINE 20 MILLIGRAM(S): 10 INJECTION INTRAVENOUS at 12:03

## 2021-10-22 RX ADMIN — RIVAROXABAN 15 MILLIGRAM(S): KIT at 08:14

## 2021-10-22 NOTE — DISCHARGE NOTE PROVIDER - NSDCMRMEDTOKEN_GEN_ALL_CORE_FT
*Patient is from [facility]: [phone]  acetaminophen 325 mg oral tablet: 2 tab(s) orally every 6 hours  amLODIPine 2.5 mg oral tablet: 1 tab(s) orally once a day  Junito 2% topical cream: Apply topically to affected area once a day  cholestyramine 4 g/5 g oral powder for reconstitution: 1 packet(s) orally 2 times a day as directed  famotidine 20 mg oral tablet, disintegrating: orally once a day  lidocaine 4% topical film: Apply topically to affected area every 24 hours  Metamucil 3.4 g/3.7 g oral powder for reconstitution: orally once a day  oxyCODONE 5 mg oral tablet: 0.5 tab(s) orally every 4 hours  senna oral tablet: 2 tab(s) orally once a day  tamsulosin 0.4 mg oral capsule: 1 cap(s) orally once a day   *Patient is from [facility]: [phone]  acetaminophen 325 mg oral tablet: 2 tab(s) orally every 6 hours  amLODIPine 2.5 mg oral tablet: 1 tab(s) orally once a day  Junito 2% topical cream: Apply topically to affected area once a day  bisacodyl 10 mg rectal suppository: 1 suppository(ies) rectal once a day, As needed, Constipation  cholestyramine 4 g/5 g oral powder for reconstitution: 1 packet(s) orally 2 times a day as directed  famotidine 20 mg oral tablet, disintegrating: orally once a day  lidocaine 4% topical film: Apply topically to affected area every 24 hours  Metamucil 3.4 g/3.7 g oral powder for reconstitution: orally once a day  oxyCODONE 5 mg oral tablet: 0.5 tab(s) orally every 4 hours  rivaroxaban 15 mg oral tablet: 1 tab(s) orally 2 times a day (with meals)  senna oral tablet: 2 tab(s) orally once a day  tamsulosin 0.4 mg oral capsule: 1 cap(s) orally once a day

## 2021-10-22 NOTE — DISCHARGE NOTE NURSING/CASE MANAGEMENT/SOCIAL WORK - PATIENT PORTAL LINK FT
You can access the FollowMyHealth Patient Portal offered by [facility] by registering at the following website: [email or web address]. By joining [facility]’s FollowMyHealth portal, you will also be able to view your health information using other applications (apps) compatible with our system.

## 2021-10-22 NOTE — DISCHARGE NOTE PROVIDER - HOSPITAL COURSE
89 y/o M with a history of CAD, HTN, HLD, BPH, gallbladder disease s/p biliary stent in 2018, metastatic prostate cancer who was sent to the ED from [facility] secondary to dyspnea after getting 2 units of PRBCs and was found to have bilateral pulmonary emboli & bilateral DVTs currently stable pending rehab placement.    B/l pulmonary embolism and bilateral DVTs  - CT chest showed pulmonary emboli in the right upper lobar pulmonary artery extending into segmental and subsegmental branches, right middle lobar and right lower lobar segmental and subsegmental branches, and a few left upper lobar and lower lobar segmental and subsegmental branches w/ a moderate left and small right pleural effusion with associated compressive atelectasis  - LE US showed acute partially occlusive thrombus in the left common femoral and femoral veins as well as the right femoral vein  - I spoke w/ his daughter and offered coumadin vs DOAC. She decided on DAOC and he has been started on Xarelto  - wean off oxygen    Pancreas cancer  - CT showed the known pancreatic head mass, which was poorly visualized, as well as, scattered low-attenuation lesions in the liver concerning for metastatic disease w/ a small volume of ascites  - c/w oxycodone prn  - daughter stated that she would like for the patient to ultimately go home on home hospice    GERD  - CT showed Mild thickening of the mid to distal esophageal walls concerning for esophagitis  - c/w Pepcid    Essential hypertension  - c/w amlodipine    BPH without urinary obstruction  - c/w tamsulosin    Constipation  - c/w Miralax, senna w/ PRN dulcolax     D/c to rehab. Follow up with PMD 2 weeks.

## 2021-10-22 NOTE — DISCHARGE NOTE PROVIDER - DETAILS OF MALNUTRITION DIAGNOSIS/DIAGNOSES
This patient has been assessed with a concern for Malnutrition and was treated during this hospitalization for the following Nutrition diagnosis/diagnoses:     -  10/20/2021: Severe protein-calorie malnutrition

## 2021-11-02 DIAGNOSIS — C25.0 MALIGNANT NEOPLASM OF HEAD OF PANCREAS: ICD-10-CM

## 2021-11-02 DIAGNOSIS — C78.7 SECONDARY MALIGNANT NEOPLASM OF LIVER AND INTRAHEPATIC BILE DUCT: ICD-10-CM

## 2021-11-02 DIAGNOSIS — R63.0 ANOREXIA: ICD-10-CM

## 2021-11-02 DIAGNOSIS — K59.00 CONSTIPATION, UNSPECIFIED: ICD-10-CM

## 2021-11-02 DIAGNOSIS — R07.9 CHEST PAIN, UNSPECIFIED: ICD-10-CM

## 2021-11-02 DIAGNOSIS — I25.10 ATHEROSCLEROTIC HEART DISEASE OF NATIVE CORONARY ARTERY WITHOUT ANGINA PECTORIS: ICD-10-CM

## 2021-11-02 DIAGNOSIS — E43 UNSPECIFIED SEVERE PROTEIN-CALORIE MALNUTRITION: ICD-10-CM

## 2021-11-02 DIAGNOSIS — R62.7 ADULT FAILURE TO THRIVE: ICD-10-CM

## 2021-11-02 DIAGNOSIS — H91.93 UNSPECIFIED HEARING LOSS, BILATERAL: ICD-10-CM

## 2021-11-02 DIAGNOSIS — G89.3 NEOPLASM RELATED PAIN (ACUTE) (CHRONIC): ICD-10-CM

## 2021-11-02 DIAGNOSIS — K21.00 GASTRO-ESOPHAGEAL REFLUX DISEASE WITH ESOPHAGITIS, WITHOUT BLEEDING: ICD-10-CM

## 2021-11-02 DIAGNOSIS — Z66 DO NOT RESUSCITATE: ICD-10-CM

## 2021-11-02 DIAGNOSIS — Z96.642 PRESENCE OF LEFT ARTIFICIAL HIP JOINT: ICD-10-CM

## 2021-11-02 DIAGNOSIS — I10 ESSENTIAL (PRIMARY) HYPERTENSION: ICD-10-CM

## 2021-11-02 DIAGNOSIS — I26.99 OTHER PULMONARY EMBOLISM WITHOUT ACUTE COR PULMONALE: ICD-10-CM

## 2021-11-02 DIAGNOSIS — L89.151 PRESSURE ULCER OF SACRAL REGION, STAGE 1: ICD-10-CM

## 2021-11-02 DIAGNOSIS — E78.5 HYPERLIPIDEMIA, UNSPECIFIED: ICD-10-CM

## 2021-11-02 DIAGNOSIS — Z79.891 LONG TERM (CURRENT) USE OF OPIATE ANALGESIC: ICD-10-CM

## 2021-11-02 DIAGNOSIS — J98.11 ATELECTASIS: ICD-10-CM

## 2021-11-02 DIAGNOSIS — Z91.018 ALLERGY TO OTHER FOODS: ICD-10-CM

## 2021-11-02 DIAGNOSIS — J90 PLEURAL EFFUSION, NOT ELSEWHERE CLASSIFIED: ICD-10-CM

## 2021-11-02 DIAGNOSIS — R18.8 OTHER ASCITES: ICD-10-CM

## 2021-11-02 DIAGNOSIS — I82.413 ACUTE EMBOLISM AND THROMBOSIS OF FEMORAL VEIN, BILATERAL: ICD-10-CM

## 2021-11-02 DIAGNOSIS — Z82.49 FAMILY HISTORY OF ISCHEMIC HEART DISEASE AND OTHER DISEASES OF THE CIRCULATORY SYSTEM: ICD-10-CM

## 2021-11-02 DIAGNOSIS — N40.0 BENIGN PROSTATIC HYPERPLASIA WITHOUT LOWER URINARY TRACT SYMPTOMS: ICD-10-CM

## 2021-11-02 NOTE — ED ADULT NURSE NOTE - CHPI ED NUR SEVERITY2
PAIN SCALE 5 OF 10. Azathioprine Pregnancy And Lactation Text: This medication is Pregnancy Category D and isn't considered safe during pregnancy. It is unknown if this medication is excreted in breast milk.

## 2022-03-02 NOTE — ED ADULT NURSE NOTE - OBJECTIVE STATEMENT
89 y m came to the ed by ems after a mechanical fall. states he didn't lift his leg high enough to get up on the curb. came to the ed with a c-collar in place. has small lac on the left side of his forehead. has some abrasions on his left hand. patient had c-collar cleared by ed md. patient is a/ox3. pupils are unequal due to previous surgery. patient denies any dizziness or lightheadedness. denies fevers, chills, chest pain, sob. skin is warm and dry. Bcc Infiltrative Histology Text: The dermis contains distinctive tumor cell masses of various shapes and sizes composed of cells with large oval or elongated nuclei with relatively little cytoplasm.  The nuclei are homogenous.  There is no pronounced variation in size or intensity of staining and no significant anaplastic appearance.  The cells at the outer aspect of the tumor masses show palisading of nuclei.  Tumor masses are surrounded by a connective tissue stroma and in some areas there is retraction artifact of the tumor nodule away from the surrounding stroma.  The tumor nests are sharply angulated and demonstrating an infiltrating pattern extending deeply with surrounding fibrosis.

## 2023-03-14 NOTE — PROVIDER CONTACT NOTE (CRITICAL VALUE NOTIFICATION) - ASSESSMENT
Patient in bed, alert and eating evening meal. No longer lethargic s/p IV fluids LR @ 50 ml/hr No abnormal movements

## 2023-10-09 NOTE — PHYSICAL THERAPY INITIAL EVALUATION ADULT - GROSSLY INTACT, SENSORY
Grossly Intact Ftsg Text: The defect edges were debeveled with a #15 scalpel blade. Given the location of the defect, shape of the defect and the proximity to free margins a full thickness skin graft was deemed most appropriate. Using a sterile surgical marker, the primary defect shape was transferred to the donor site. The area thus outlined was incised deep to adipose tissue with a #15 scalpel blade.  The harvested graft was then trimmed of adipose tissue until only dermis and epidermis was left.  The skin margins of the secondary defect were undermined to an appropriate distance in all directions utilizing iris scissors.  The secondary defect was closed with interrupted buried subcutaneous sutures.  The skin edges were then re-apposed with running  sutures.  The skin graft was then placed in the primary defect and oriented appropriately.

## 2024-02-04 NOTE — PROGRESS NOTE ADULT - PROBLEM SELECTOR PROBLEM 4
Lab Results   Component Value Date    EGFR 6 02/04/2024    EGFR 7 02/03/2024    EGFR 5 01/29/2024    CREATININE 8.56 (H) 02/04/2024    CREATININE 8.36 (H) 02/03/2024    CREATININE 10.07 (H) 01/29/2024     Recent Labs     02/03/24  2220 02/04/24  0936 02/05/24  0527   HGB 8.2* 7.9* 7.4*   HCT 25.7* 25.0* 23.6*     Trend Hgb, replete if <7  
Spondylolisthesis, lumbar region
DVT prophylaxis
Adult failure to thrive
Spondylolisthesis, lumbar region
DVT prophylaxis
Adult failure to thrive
Spondylolisthesis, lumbar region

## 2024-02-05 NOTE — ED PROVIDER NOTE - NSTIMEPROVIDERCAREINITIATE_GEN_ER
What Type Of Note Output Would You Prefer (Optional)?: Standard Output Hpi Title: Evaluation of Skin Lesions How Severe Are Your Spot(S)?: moderate Have Your Spot(S) Been Treated In The Past?: has not been treated Location: Rt upper back Year Removed: 2021 17-Oct-2021 04:14

## 2024-11-01 NOTE — ED ADULT TRIAGE NOTE - AS O2 DELIVERY
Number Of Freeze-Thaw Cycles: 1 freeze-thaw cycle Detail Level: Detailed Render Post-Care Instructions In Note?: yes Render Note In Bullet Format When Appropriate: No Duration Of Freeze Thaw-Cycle (Seconds): 5 Post-Care Instructions: I reviewed with the patient in detail post-care instructions. Patient is to wear sunprotection, and avoid picking at any of the treated lesions. Pt may apply Vaseline to crusted or scabbing areas. Consent: The patient's consent was obtained including but not limited to risks of crusting, scabbing, blistering, scarring, darker or lighter pigmentary change, recurrence, incomplete removal and infection. room air

## 2024-12-30 NOTE — PATIENT PROFILE ADULT - TOBACCO USE
Patient would just need to call [facility] to scheduled order/referral already in [facility]. Phone [phone] or Phone. [phone]   Never smoker